# Patient Record
Sex: MALE | Race: WHITE | Employment: FULL TIME | URBAN - METROPOLITAN AREA
[De-identification: names, ages, dates, MRNs, and addresses within clinical notes are randomized per-mention and may not be internally consistent; named-entity substitution may affect disease eponyms.]

---

## 2023-04-21 ENCOUNTER — APPOINTMENT (OUTPATIENT)
Dept: GENERAL RADIOLOGY | Age: 83
End: 2023-04-21
Payer: MEDICARE

## 2023-04-21 ENCOUNTER — HOSPITAL ENCOUNTER (EMERGENCY)
Age: 83
Discharge: ANOTHER HOSPITAL | End: 2023-04-22
Attending: EMERGENCY MEDICINE
Payer: MEDICARE

## 2023-04-21 DIAGNOSIS — L03.119 CELLULITIS OF LOWER EXTREMITY, UNSPECIFIED LATERALITY: ICD-10-CM

## 2023-04-21 DIAGNOSIS — I50.9 CONGESTIVE HEART FAILURE, UNSPECIFIED HF CHRONICITY, UNSPECIFIED HEART FAILURE TYPE (HCC): ICD-10-CM

## 2023-04-21 DIAGNOSIS — I21.4 NSTEMI (NON-ST ELEVATED MYOCARDIAL INFARCTION) (HCC): Primary | ICD-10-CM

## 2023-04-21 LAB
ABSOLUTE EOS #: 0.1 K/UL (ref 0–0.4)
ABSOLUTE LYMPH #: 1 K/UL (ref 1–4.8)
ABSOLUTE MONO #: 0.6 K/UL (ref 0.1–1.2)
ANION GAP SERPL CALCULATED.3IONS-SCNC: 13 MMOL/L (ref 9–17)
BASOPHILS # BLD: 1 % (ref 0–2)
BASOPHILS ABSOLUTE: 0 K/UL (ref 0–0.2)
BNP SERPL-MCNC: 4339 PG/ML
BUN SERPL-MCNC: 25 MG/DL (ref 8–23)
CALCIUM SERPL-MCNC: 9.8 MG/DL (ref 8.6–10.4)
CHLORIDE SERPL-SCNC: 98 MMOL/L (ref 98–107)
CO2 SERPL-SCNC: 25 MMOL/L (ref 20–31)
CREAT SERPL-MCNC: 1.12 MG/DL (ref 0.7–1.2)
EOSINOPHILS RELATIVE PERCENT: 2 % (ref 1–4)
GFR SERPL CREATININE-BSD FRML MDRD: >60 ML/MIN/1.73M2
GLUCOSE SERPL-MCNC: 121 MG/DL (ref 70–99)
HCT VFR BLD AUTO: 36.3 % (ref 41–53)
HGB BLD-MCNC: 11.6 G/DL (ref 13.5–17.5)
LACTIC ACID, SEPSIS: 1.5 MMOL/L (ref 0.5–1.9)
LYMPHOCYTES # BLD: 25 % (ref 24–44)
MCH RBC QN AUTO: 29.6 PG (ref 26–34)
MCHC RBC AUTO-ENTMCNC: 31.8 G/DL (ref 31–37)
MCV RBC AUTO: 93 FL (ref 80–100)
MONOCYTES # BLD: 15 % (ref 2–11)
PARTIAL THROMBOPLASTIN TIME: 28.8 SEC (ref 21.3–31.3)
PDW BLD-RTO: 17.7 % (ref 12.5–15.4)
PLATELET # BLD AUTO: 258 K/UL (ref 140–450)
PMV BLD AUTO: 7.5 FL (ref 6–12)
POTASSIUM SERPL-SCNC: 4.8 MMOL/L (ref 3.7–5.3)
RBC # BLD: 3.9 M/UL (ref 4.5–5.9)
SEG NEUTROPHILS: 57 % (ref 36–66)
SEGMENTED NEUTROPHILS ABSOLUTE COUNT: 2.4 K/UL (ref 1.8–7.7)
SODIUM SERPL-SCNC: 136 MMOL/L (ref 135–144)
TROPONIN I SERPL DL<=0.01 NG/ML-MCNC: 71 NG/L (ref 0–22)
TROPONIN I SERPL DL<=0.01 NG/ML-MCNC: 80 NG/L (ref 0–22)
WBC # BLD AUTO: 4.1 K/UL (ref 3.5–11)

## 2023-04-21 PROCEDURE — 36415 COLL VENOUS BLD VENIPUNCTURE: CPT

## 2023-04-21 PROCEDURE — 2580000003 HC RX 258: Performed by: EMERGENCY MEDICINE

## 2023-04-21 PROCEDURE — 96365 THER/PROPH/DIAG IV INF INIT: CPT

## 2023-04-21 PROCEDURE — 84484 ASSAY OF TROPONIN QUANT: CPT

## 2023-04-21 PROCEDURE — 83880 ASSAY OF NATRIURETIC PEPTIDE: CPT

## 2023-04-21 PROCEDURE — 96376 TX/PRO/DX INJ SAME DRUG ADON: CPT

## 2023-04-21 PROCEDURE — 93005 ELECTROCARDIOGRAM TRACING: CPT | Performed by: EMERGENCY MEDICINE

## 2023-04-21 PROCEDURE — 83605 ASSAY OF LACTIC ACID: CPT

## 2023-04-21 PROCEDURE — 71045 X-RAY EXAM CHEST 1 VIEW: CPT

## 2023-04-21 PROCEDURE — 6370000000 HC RX 637 (ALT 250 FOR IP): Performed by: EMERGENCY MEDICINE

## 2023-04-21 PROCEDURE — 96375 TX/PRO/DX INJ NEW DRUG ADDON: CPT

## 2023-04-21 PROCEDURE — 99285 EMERGENCY DEPT VISIT HI MDM: CPT

## 2023-04-21 PROCEDURE — 80048 BASIC METABOLIC PNL TOTAL CA: CPT

## 2023-04-21 PROCEDURE — 85025 COMPLETE CBC W/AUTO DIFF WBC: CPT

## 2023-04-21 PROCEDURE — 85730 THROMBOPLASTIN TIME PARTIAL: CPT

## 2023-04-21 PROCEDURE — 6360000002 HC RX W HCPCS: Performed by: EMERGENCY MEDICINE

## 2023-04-21 RX ORDER — ALLOPURINOL 300 MG/1
300 TABLET ORAL DAILY
COMMUNITY
Start: 2009-10-27

## 2023-04-21 RX ORDER — HEPARIN SODIUM 10000 [USP'U]/100ML
5-30 INJECTION, SOLUTION INTRAVENOUS CONTINUOUS
Status: DISCONTINUED | OUTPATIENT
Start: 2023-04-21 | End: 2023-04-22 | Stop reason: HOSPADM

## 2023-04-21 RX ORDER — HEPARIN SODIUM 1000 [USP'U]/ML
4000 INJECTION, SOLUTION INTRAVENOUS; SUBCUTANEOUS PRN
Status: DISCONTINUED | OUTPATIENT
Start: 2023-04-21 | End: 2023-04-22 | Stop reason: HOSPADM

## 2023-04-21 RX ORDER — HEPARIN SODIUM 1000 [USP'U]/ML
2000 INJECTION, SOLUTION INTRAVENOUS; SUBCUTANEOUS PRN
Status: DISCONTINUED | OUTPATIENT
Start: 2023-04-21 | End: 2023-04-22 | Stop reason: HOSPADM

## 2023-04-21 RX ORDER — IPRATROPIUM BROMIDE 42 UG/1
2 SPRAY, METERED NASAL 3 TIMES DAILY
COMMUNITY
Start: 2022-08-31

## 2023-04-21 RX ORDER — TRIAMCINOLONE ACETONIDE 1 MG/G
CREAM TOPICAL PRN
COMMUNITY
Start: 2019-12-30

## 2023-04-21 RX ORDER — NIFEDIPINE 30 MG/1
30 TABLET, EXTENDED RELEASE ORAL
COMMUNITY
Start: 2019-06-21

## 2023-04-21 RX ORDER — ASPIRIN 81 MG/1
324 TABLET, CHEWABLE ORAL ONCE
Status: COMPLETED | OUTPATIENT
Start: 2023-04-21 | End: 2023-04-21

## 2023-04-21 RX ORDER — FUROSEMIDE 10 MG/ML
40 INJECTION INTRAMUSCULAR; INTRAVENOUS ONCE
Status: COMPLETED | OUTPATIENT
Start: 2023-04-21 | End: 2023-04-21

## 2023-04-21 RX ORDER — HEPARIN SODIUM 1000 [USP'U]/ML
4000 INJECTION, SOLUTION INTRAVENOUS; SUBCUTANEOUS ONCE
Status: COMPLETED | OUTPATIENT
Start: 2023-04-21 | End: 2023-04-21

## 2023-04-21 RX ORDER — BUMETANIDE 1 MG/1
1 TABLET ORAL
COMMUNITY
Start: 2015-05-29

## 2023-04-21 RX ORDER — SPIRONOLACTONE 25 MG/1
25 TABLET ORAL
COMMUNITY
Start: 2014-04-15

## 2023-04-21 RX ORDER — ACETAMINOPHEN 500 MG
500 TABLET ORAL EVERY 4 HOURS
COMMUNITY
Start: 2022-05-21

## 2023-04-21 RX ORDER — LOSARTAN POTASSIUM 100 MG/1
100 TABLET ORAL
COMMUNITY
Start: 2019-12-16

## 2023-04-21 RX ADMIN — HEPARIN SODIUM 8 UNITS/KG/HR: 10000 INJECTION, SOLUTION INTRAVENOUS at 22:24

## 2023-04-21 RX ADMIN — ASPIRIN 81 MG CHEWABLE TABLET 324 MG: 81 TABLET CHEWABLE at 20:12

## 2023-04-21 RX ADMIN — CEFAZOLIN 1000 MG: 1 INJECTION, POWDER, FOR SOLUTION INTRAMUSCULAR; INTRAVENOUS at 20:17

## 2023-04-21 RX ADMIN — HEPARIN SODIUM 4000 UNITS: 1000 INJECTION INTRAVENOUS; SUBCUTANEOUS at 22:22

## 2023-04-21 RX ADMIN — FUROSEMIDE 40 MG: 10 INJECTION, SOLUTION INTRAMUSCULAR; INTRAVENOUS at 21:30

## 2023-04-21 ASSESSMENT — PAIN - FUNCTIONAL ASSESSMENT
PAIN_FUNCTIONAL_ASSESSMENT: 0-10
PAIN_FUNCTIONAL_ASSESSMENT: PREVENTS OR INTERFERES SOME ACTIVE ACTIVITIES AND ADLS

## 2023-04-21 ASSESSMENT — ENCOUNTER SYMPTOMS
DIARRHEA: 0
ABDOMINAL PAIN: 0
VOMITING: 0
SORE THROAT: 0
COUGH: 0
SHORTNESS OF BREATH: 0
BACK PAIN: 0
NAUSEA: 0
PHOTOPHOBIA: 0
RHINORRHEA: 0

## 2023-04-21 ASSESSMENT — PAIN DESCRIPTION - LOCATION: LOCATION: LEG

## 2023-04-21 ASSESSMENT — PAIN DESCRIPTION - DESCRIPTORS: DESCRIPTORS: PRESSURE;PINS AND NEEDLES

## 2023-04-21 ASSESSMENT — PAIN DESCRIPTION - PAIN TYPE: TYPE: CHRONIC PAIN

## 2023-04-21 ASSESSMENT — PAIN SCALES - GENERAL: PAINLEVEL_OUTOF10: 6

## 2023-04-21 ASSESSMENT — PAIN DESCRIPTION - FREQUENCY: FREQUENCY: CONTINUOUS

## 2023-04-21 ASSESSMENT — PAIN DESCRIPTION - ORIENTATION: ORIENTATION: RIGHT;LEFT;LOWER

## 2023-04-21 ASSESSMENT — PAIN DESCRIPTION - ONSET: ONSET: GRADUAL

## 2023-04-21 NOTE — ED PROVIDER NOTES
[address] Emergency Department  [address] [facility] [address] [facility] [address]  Phone: [phone]  Fax: [phone]        Pt Name: Matthew Schumacher  MRN: [id number]  [place] 1940  Date of evaluation: 4/21/23      CHIEF COMPLAINT     Chief Complaint   Patient presents with    Leg Swelling     Patient complains of lower extremity edema and redness. Swelling has worsened over the past month. Patient states that his legs are warm to the touch. HISTORY OF PRESENT ILLNESS  (Location/Symptom, Timing/Onset, Context/Setting, Quality, Duration, Modifying Factors, Severity.)    Matthew Schumacher is a [de-identified] y.o. male who presents with bilateral lower extremity wound and leg swelling. The legs have been since January. The wounds have been gradually growing since February. The wounds are painful. No fever. He does have chills. The patient is diabetic. He has a history of atrial fibrillation and he takes Xarelto. No recent antibiotics. He does have a history of CHF and takes his diuretics as directed. He has had some chest tightness. No shortness of breath. No nausea or vomiting. No palpitations. The patient reports a 30 lb weight gain since January. REVIEW OF SYSTEMS    (2-9 systems for level 4, 10 or more for level 5)     Review of Systems   Constitutional:  Positive for chills. Negative for fever. HENT:  Positive for congestion. Negative for rhinorrhea and sore throat. Eyes:  Negative for photophobia and visual disturbance. Respiratory:  Negative for cough and shortness of breath. Cardiovascular:  Positive for chest pain. Negative for palpitations. Gastrointestinal:  Negative for abdominal pain, diarrhea, nausea and vomiting. Genitourinary:  Negative for dysuria, frequency and urgency. Musculoskeletal:  Negative for back pain and neck pain. Skin:  Negative for rash and wound. Neurological:  Negative for dizziness, light-headedness and headaches.    Hematological:  Negative

## 2023-04-22 VITALS
BODY MASS INDEX: 43.02 KG/M2 | WEIGHT: 252 LBS | SYSTOLIC BLOOD PRESSURE: 99 MMHG | HEART RATE: 95 BPM | HEIGHT: 64 IN | RESPIRATION RATE: 14 BRPM | OXYGEN SATURATION: 95 % | DIASTOLIC BLOOD PRESSURE: 58 MMHG | TEMPERATURE: 98.4 F

## 2023-04-22 LAB
EKG ATRIAL RATE: 115 BPM
EKG Q-T INTERVAL: 374 MS
EKG QRS DURATION: 132 MS
EKG QTC CALCULATION (BAZETT): 479 MS
EKG R AXIS: -4 DEGREES
EKG T AXIS: 92 DEGREES
EKG VENTRICULAR RATE: 99 BPM

## 2023-04-22 NOTE — ED NOTES
Per pt request, would like to be transferred to [facility].  Dr Ishmael Kerr requested to speak to hospitalist at [facility].  Called [facility] access to have hospitalist reyna Gotti RN  04/21/23 [address] [place] [address]

## 2023-04-22 NOTE — ED PROVIDER NOTES
FACULTY SIGN-OUT  ADDENDUM     Care of this patient was assumed from Dr. Perlita Arreaga. The patient was seen for Leg Swelling (Patient complains of lower extremity edema and redness. Swelling has worsened over the past month. Patient states that his legs are warm to the touch.)  . The patient's initial evaluation and plan have been discussed with the prior provider who initially evaluated the patient. Nursing Notes, Past Medical Hx, Past Surgical Hx, Social Hx, Allergies, and Family Hx were all reviewed. ED COURSE      The patient was given the following medications:  Orders Placed This Encounter   Medications    aspirin chewable tablet 324 mg    ceFAZolin (ANCEF) 1,000 mg in sodium chloride 0.9 % 50 mL IVPB (mini-bag)     Order Specific Question:   Antimicrobial Indications     Answer:   Skin and Soft Tissue Infection    heparin (porcine) injection 4,000 Units    heparin (porcine) injection 4,000 Units    heparin (porcine) injection 2,000 Units    heparin 25,000 units in dextrose 5% 250 mL (premix) infusion       Labs Reviewed   BASIC METABOLIC PANEL - Abnormal; Notable for the following components:       Result Value    Glucose 121 (*)     BUN 25 (*)     All other components within normal limits   CBC WITH AUTO DIFFERENTIAL - Abnormal; Notable for the following components:    RBC 3.90 (*)     Hemoglobin 11.6 (*)     Hematocrit 36.3 (*)     RDW 17.7 (*)     Monocytes 15 (*)     All other components within normal limits   BRAIN NATRIURETIC PEPTIDE - Abnormal; Notable for the following components:    Pro-BNP 4,339 (*)     All other components within normal limits   TROPONIN - Abnormal; Notable for the following components:    Troponin, High Sensitivity 80 (*)     All other components within normal limits   LACTATE, SEPSIS   TROPONIN   APTT   APTT   Labs reviewed. XR CHEST PORTABLE    Result Date: 4/21/2023  EXAMINATION: ONE XRAY VIEW OF THE CHEST 4/21/2023 3:49 pm COMPARISON: None.  HISTORY: ORDERING SYSTEM

## 2024-07-29 ENCOUNTER — TELEPHONE (OUTPATIENT)
Dept: WOUND CARE | Age: 84
End: 2024-07-29

## 2024-07-29 NOTE — DISCHARGE INSTRUCTIONS
[facility] WOUND and HYPERBARIC TREATMENT  CENTER      Visit  Discharge Instructions / Physician Orders  DATE:     Home Care:     SUPPLIES ORDERED THRU:                     DATE LAST SUPPLIED     Wound Location:       Cleanse with: Liquid antibacterial soap and water, rinse well      Dressing Orders:  Primary dressing                       Secondary dressing                           secure with           x 30days     Frequency:       Additional Orders: Increase protein to diet (meat, cheese, eggs, fish, peanut butter, nuts and beans)  Multivitamin daily    OFFLOADING [] YES  TYPE:                  [] NA    Weekly wound care visits until determined otherwise.    Antibiotic therapy-wound care related YES [] NO [] NA[]    MY CHART []     Smart Device  []     HYPERBARIC TREATMENT-                TREATMENT #                          Your next appointment with the Wound Care Center is in 1 week                                                                                                   (Please note your next appointment above and if you are unable to keep, kindly give a 24 hour notice. Thank you.)  If more than 15 min late we cannot guarantee you will be seen due to clinician schedule  Per Policy, Excessive cancellation will call for dismissal from program.  If you experience any of the following, please call the Wound Care Center during business hours:  [phone]     * Increase in Pain  * Temperature over 101  * Increase in drainage from your wound  * Drainage with a foul odor  * Bleeding  * Increase in swelling  * Need for compression bandage changes due to slippage, breakthrough drainage.     If you need medical attention outside of the business hours of the Wound Care Centers please contact your PCP or go to the nearest emergency room.     The information contained in the After Visit Summary has been reviewed with me, the patient and/or responsible adult, by my health care provider(s). I had the

## 2024-07-29 NOTE — TELEPHONE ENCOUNTER
Patient advocate calling and canceling his [facility] wound care appointment on 7-31-24 stating that they had a home care nurse coming to the home and wanted to cancel this appointment with us, will call back if reschedule is needed.

## 2024-07-31 ENCOUNTER — HOSPITAL ENCOUNTER (OUTPATIENT)
Dept: WOUND CARE | Age: 84
Discharge: HOME | End: 2024-07-31

## 2024-10-05 ENCOUNTER — HOSPITAL ENCOUNTER (OUTPATIENT)
Age: 84
Setting detail: SPECIMEN
Discharge: HOME | End: 2024-10-05

## 2024-10-05 LAB
ANION GAP SERPL CALCULATED.3IONS-SCNC: 9 MMOL/L (ref 9–17)
BUN SERPL-MCNC: 51 MG/DL (ref 8–23)
BUN/CREAT SERPL: 43 (ref 9–20)
CALCIUM SERPL-MCNC: 9.2 MG/DL (ref 8.6–10.4)
CHLORIDE SERPL-SCNC: 103 MMOL/L (ref 98–107)
CO2 SERPL-SCNC: 26 MMOL/L (ref 20–31)
CREAT SERPL-MCNC: 1.2 MG/DL (ref 0.7–1.2)
ERYTHROCYTE [DISTWIDTH] IN BLOOD BY AUTOMATED COUNT: 19.3 % (ref 11.8–14.4)
GFR, ESTIMATED: 60 ML/MIN/1.73M2
GLUCOSE SERPL-MCNC: 182 MG/DL (ref 70–99)
HCT VFR BLD AUTO: 31.1 % (ref 40.7–50.3)
HGB BLD-MCNC: 9 G/DL (ref 13–17)
MCH RBC QN AUTO: 31.1 PG (ref 25.2–33.5)
MCHC RBC AUTO-ENTMCNC: 28.9 G/DL (ref 28.4–34.8)
MCV RBC AUTO: 107.6 FL (ref 82.6–102.9)
NRBC BLD-RTO: 0 PER 100 WBC
PLATELET # BLD AUTO: 171 K/UL (ref 138–453)
PMV BLD AUTO: 10.1 FL (ref 8.1–13.5)
POTASSIUM SERPL-SCNC: 5 MMOL/L (ref 3.7–5.3)
RBC # BLD AUTO: 2.89 M/UL (ref 4.21–5.77)
SODIUM SERPL-SCNC: 138 MMOL/L (ref 135–144)
WBC OTHER # BLD: 5.1 K/UL (ref 3.5–11.3)

## 2024-10-05 PROCEDURE — 80048 BASIC METABOLIC PNL TOTAL CA: CPT

## 2024-10-05 PROCEDURE — P9603 ONE-WAY ALLOW PRORATED MILES: HCPCS

## 2024-10-05 PROCEDURE — 36415 COLL VENOUS BLD VENIPUNCTURE: CPT

## 2024-10-05 PROCEDURE — 85027 COMPLETE CBC AUTOMATED: CPT

## 2024-10-10 ENCOUNTER — HOSPITAL ENCOUNTER (OUTPATIENT)
Age: 84
Setting detail: SPECIMEN
Discharge: HOME | End: 2024-10-10

## 2024-10-10 LAB
ANION GAP SERPL CALCULATED.3IONS-SCNC: 9 MMOL/L (ref 9–17)
BUN SERPL-MCNC: 46 MG/DL (ref 8–23)
BUN/CREAT SERPL: 35 (ref 9–20)
CALCIUM SERPL-MCNC: 9 MG/DL (ref 8.6–10.4)
CHLORIDE SERPL-SCNC: 100 MMOL/L (ref 98–107)
CO2 SERPL-SCNC: 29 MMOL/L (ref 20–31)
CREAT SERPL-MCNC: 1.3 MG/DL (ref 0.7–1.2)
ERYTHROCYTE [DISTWIDTH] IN BLOOD BY AUTOMATED COUNT: 18.8 % (ref 11.8–14.4)
GFR, ESTIMATED: 55 ML/MIN/1.73M2
GLUCOSE SERPL-MCNC: 143 MG/DL (ref 70–99)
HCT VFR BLD AUTO: 29.1 % (ref 40.7–50.3)
HGB BLD-MCNC: 8.8 G/DL (ref 13–17)
MCH RBC QN AUTO: 31.1 PG (ref 25.2–33.5)
MCHC RBC AUTO-ENTMCNC: 30.2 G/DL (ref 28.4–34.8)
MCV RBC AUTO: 102.8 FL (ref 82.6–102.9)
NRBC BLD-RTO: 0 PER 100 WBC
PLATELET # BLD AUTO: 181 K/UL (ref 138–453)
PMV BLD AUTO: 9.6 FL (ref 8.1–13.5)
POTASSIUM SERPL-SCNC: 4.5 MMOL/L (ref 3.7–5.3)
RBC # BLD AUTO: 2.83 M/UL (ref 4.21–5.77)
SODIUM SERPL-SCNC: 138 MMOL/L (ref 135–144)
WBC OTHER # BLD: 3.8 K/UL (ref 3.5–11.3)

## 2024-10-10 PROCEDURE — 80048 BASIC METABOLIC PNL TOTAL CA: CPT

## 2024-10-10 PROCEDURE — P9603 ONE-WAY ALLOW PRORATED MILES: HCPCS

## 2024-10-10 PROCEDURE — 85027 COMPLETE CBC AUTOMATED: CPT

## 2024-10-14 ENCOUNTER — HOSPITAL ENCOUNTER (OUTPATIENT)
Age: 84
Setting detail: SPECIMEN
Discharge: HOME | End: 2024-10-14

## 2024-10-14 LAB — BNP SERPL-MCNC: 6855 PG/ML

## 2024-10-14 PROCEDURE — 83880 ASSAY OF NATRIURETIC PEPTIDE: CPT

## 2024-10-14 PROCEDURE — 36415 COLL VENOUS BLD VENIPUNCTURE: CPT

## 2024-10-14 PROCEDURE — P9603 ONE-WAY ALLOW PRORATED MILES: HCPCS

## 2024-10-15 ENCOUNTER — HOSPITAL ENCOUNTER (OUTPATIENT)
Age: 84
Setting detail: SPECIMEN
Discharge: HOME | End: 2024-10-15

## 2024-10-15 LAB
ANION GAP SERPL CALCULATED.3IONS-SCNC: 11 MMOL/L (ref 9–17)
BUN SERPL-MCNC: 48 MG/DL (ref 8–23)
BUN/CREAT SERPL: 37 (ref 9–20)
CALCIUM SERPL-MCNC: 8.8 MG/DL (ref 8.6–10.4)
CHLORIDE SERPL-SCNC: 98 MMOL/L (ref 98–107)
CO2 SERPL-SCNC: 30 MMOL/L (ref 20–31)
CREAT SERPL-MCNC: 1.3 MG/DL (ref 0.7–1.2)
GFR, ESTIMATED: 55 ML/MIN/1.73M2
GLUCOSE SERPL-MCNC: 98 MG/DL (ref 70–99)
POTASSIUM SERPL-SCNC: 3.8 MMOL/L (ref 3.7–5.3)
SODIUM SERPL-SCNC: 139 MMOL/L (ref 135–144)

## 2024-10-15 PROCEDURE — P9603 ONE-WAY ALLOW PRORATED MILES: HCPCS

## 2024-10-15 PROCEDURE — 36415 COLL VENOUS BLD VENIPUNCTURE: CPT

## 2024-10-15 PROCEDURE — 80048 BASIC METABOLIC PNL TOTAL CA: CPT

## 2024-10-23 ENCOUNTER — HOSPITAL ENCOUNTER (OUTPATIENT)
Age: 84
Setting detail: SPECIMEN
Discharge: HOME | End: 2024-10-23

## 2024-10-23 LAB
ANION GAP SERPL CALCULATED.3IONS-SCNC: 10 MMOL/L (ref 9–17)
BUN SERPL-MCNC: 49 MG/DL (ref 8–23)
BUN/CREAT SERPL: 38 (ref 9–20)
CALCIUM SERPL-MCNC: 8.8 MG/DL (ref 8.6–10.4)
CHLORIDE SERPL-SCNC: 98 MMOL/L (ref 98–107)
CO2 SERPL-SCNC: 32 MMOL/L (ref 20–31)
CREAT SERPL-MCNC: 1.3 MG/DL (ref 0.7–1.2)
GFR, ESTIMATED: 55 ML/MIN/1.73M2
GLUCOSE SERPL-MCNC: 112 MG/DL (ref 70–99)
MAGNESIUM SERPL-MCNC: 1.7 MG/DL (ref 1.6–2.6)
POTASSIUM SERPL-SCNC: 3.7 MMOL/L (ref 3.7–5.3)
SODIUM SERPL-SCNC: 140 MMOL/L (ref 135–144)

## 2024-10-23 PROCEDURE — P9603 ONE-WAY ALLOW PRORATED MILES: HCPCS

## 2024-10-23 PROCEDURE — 83735 ASSAY OF MAGNESIUM: CPT

## 2024-10-23 PROCEDURE — 36415 COLL VENOUS BLD VENIPUNCTURE: CPT

## 2024-10-23 PROCEDURE — 80048 BASIC METABOLIC PNL TOTAL CA: CPT

## 2024-10-30 ENCOUNTER — HOSPITAL ENCOUNTER (OUTPATIENT)
Age: 84
Setting detail: SPECIMEN
Discharge: HOME | End: 2024-10-30

## 2024-10-30 LAB
ANION GAP SERPL CALCULATED.3IONS-SCNC: 11 MMOL/L (ref 9–17)
BUN SERPL-MCNC: 52 MG/DL (ref 8–23)
BUN/CREAT SERPL: 37 (ref 9–20)
CALCIUM SERPL-MCNC: 9.1 MG/DL (ref 8.6–10.4)
CHLORIDE SERPL-SCNC: 97 MMOL/L (ref 98–107)
CO2 SERPL-SCNC: 32 MMOL/L (ref 20–31)
CREAT SERPL-MCNC: 1.4 MG/DL (ref 0.7–1.2)
GFR, ESTIMATED: 50 ML/MIN/1.73M2
GLUCOSE SERPL-MCNC: 132 MG/DL (ref 70–99)
POTASSIUM SERPL-SCNC: 4 MMOL/L (ref 3.7–5.3)
SODIUM SERPL-SCNC: 140 MMOL/L (ref 135–144)

## 2024-10-30 PROCEDURE — 36415 COLL VENOUS BLD VENIPUNCTURE: CPT

## 2024-10-30 PROCEDURE — P9603 ONE-WAY ALLOW PRORATED MILES: HCPCS

## 2024-10-30 PROCEDURE — 80048 BASIC METABOLIC PNL TOTAL CA: CPT

## 2024-11-18 ENCOUNTER — HOSPITAL ENCOUNTER (INPATIENT)
Age: 84
LOS: 7 days | Discharge: NURSING, REHAB OR LONG-TERM CARE | DRG: 280 | End: 2024-11-26
Attending: EMERGENCY MEDICINE | Admitting: STUDENT IN AN ORGANIZED HEALTH CARE EDUCATION/TRAINING PROGRAM
Payer: MEDICARE

## 2024-11-18 ENCOUNTER — HOSPITAL ENCOUNTER (OUTPATIENT)
Age: 84
Setting detail: SPECIMEN
Discharge: HOME | End: 2024-11-18

## 2024-11-18 ENCOUNTER — APPOINTMENT (OUTPATIENT)
Dept: GENERAL RADIOLOGY | Age: 84
DRG: 280 | End: 2024-11-18
Payer: MEDICARE

## 2024-11-18 DIAGNOSIS — G47.33 OSA (OBSTRUCTIVE SLEEP APNEA): ICD-10-CM

## 2024-11-18 DIAGNOSIS — I50.9 CONGESTIVE HEART FAILURE, UNSPECIFIED HF CHRONICITY, UNSPECIFIED HEART FAILURE TYPE (HCC): Primary | ICD-10-CM

## 2024-11-18 DIAGNOSIS — R60.1 ANASARCA: ICD-10-CM

## 2024-11-18 DIAGNOSIS — R09.02 HYPOXIA: ICD-10-CM

## 2024-11-18 DIAGNOSIS — R06.02 SHORTNESS OF BREATH: ICD-10-CM

## 2024-11-18 LAB
ALBUMIN SERPL-MCNC: 3.5 G/DL (ref 3.5–5.2)
ALBUMIN/GLOB SERPL: 1.2 {RATIO} (ref 1–2.5)
ALP SERPL-CCNC: 67 U/L (ref 40–129)
ALT SERPL-CCNC: 12 U/L (ref 5–41)
ANION GAP SERPL CALCULATED.3IONS-SCNC: 10 MMOL/L (ref 9–16)
ANION GAP SERPL CALCULATED.3IONS-SCNC: 8 MMOL/L (ref 9–17)
AST SERPL-CCNC: 23 U/L
BASOPHILS # BLD: 0 K/UL (ref 0–0.2)
BASOPHILS # BLD: <0.03 K/UL (ref 0–0.2)
BASOPHILS NFR BLD: 1 % (ref 0–2)
BASOPHILS NFR BLD: 1 % (ref 0–2)
BILIRUB SERPL-MCNC: 0.6 MG/DL (ref 0.3–1.2)
BNP SERPL-MCNC: 3461 PG/ML (ref 0–450)
BNP SERPL-MCNC: 3852 PG/ML
BUN SERPL-MCNC: 62 MG/DL (ref 8–23)
BUN SERPL-MCNC: 66 MG/DL (ref 8–23)
CALCIUM SERPL-MCNC: 9.3 MG/DL (ref 8.8–10.2)
CALCIUM SERPL-MCNC: 9.6 MG/DL (ref 8.6–10.4)
CHLORIDE SERPL-SCNC: 98 MMOL/L (ref 98–107)
CHLORIDE SERPL-SCNC: 98 MMOL/L (ref 98–107)
CO2 SERPL-SCNC: 35 MMOL/L (ref 20–31)
CO2 SERPL-SCNC: 35 MMOL/L (ref 20–31)
CREAT SERPL-MCNC: 1.5 MG/DL (ref 0.7–1.2)
CREAT SERPL-MCNC: 1.5 MG/DL (ref 0.7–1.2)
EOSINOPHIL # BLD: 0.47 K/UL (ref 0–0.44)
EOSINOPHIL # BLD: 0.6 K/UL (ref 0–0.4)
EOSINOPHILS RELATIVE PERCENT: 11 % (ref 1–4)
EOSINOPHILS RELATIVE PERCENT: 13 % (ref 1–4)
ERYTHROCYTE [DISTWIDTH] IN BLOOD BY AUTOMATED COUNT: 17.3 % (ref 11.8–14.4)
ERYTHROCYTE [DISTWIDTH] IN BLOOD BY AUTOMATED COUNT: 18.4 % (ref 12.5–15.4)
GFR, ESTIMATED: 45 ML/MIN/1.73M2
GFR, ESTIMATED: 46 ML/MIN/1.73M2
GLUCOSE SERPL-MCNC: 155 MG/DL (ref 70–99)
GLUCOSE SERPL-MCNC: 156 MG/DL (ref 82–115)
HCT VFR BLD AUTO: 30.3 % (ref 41–53)
HCT VFR BLD AUTO: 31.8 % (ref 40.7–50.3)
HGB BLD-MCNC: 9.5 G/DL (ref 13–17)
HGB BLD-MCNC: 9.6 G/DL (ref 13.5–17.5)
IMM GRANULOCYTES # BLD AUTO: 0.01 K/UL (ref 0–0.3)
IMM GRANULOCYTES NFR BLD: 0 %
LYMPHOCYTES NFR BLD: 0.8 K/UL (ref 1–4.8)
LYMPHOCYTES NFR BLD: 0.86 K/UL (ref 1.1–3.7)
LYMPHOCYTES RELATIVE PERCENT: 18 % (ref 24–44)
LYMPHOCYTES RELATIVE PERCENT: 20 % (ref 24–43)
MAGNESIUM SERPL-MCNC: 1.8 MG/DL (ref 1.6–2.6)
MCH RBC QN AUTO: 30.2 PG (ref 26–34)
MCH RBC QN AUTO: 30.4 PG (ref 25.2–33.5)
MCHC RBC AUTO-ENTMCNC: 29.9 G/DL (ref 28.4–34.8)
MCHC RBC AUTO-ENTMCNC: 31.8 G/DL (ref 31–37)
MCV RBC AUTO: 101.6 FL (ref 82.6–102.9)
MCV RBC AUTO: 94.9 FL (ref 80–100)
MONOCYTES NFR BLD: 0.5 K/UL (ref 0.1–1.2)
MONOCYTES NFR BLD: 0.51 K/UL (ref 0.1–1.2)
MONOCYTES NFR BLD: 11 % (ref 2–11)
MONOCYTES NFR BLD: 12 % (ref 3–12)
NEUTROPHILS NFR BLD: 56 % (ref 36–65)
NEUTROPHILS NFR BLD: 57 % (ref 36–66)
NEUTS SEG NFR BLD: 2.43 K/UL (ref 1.5–8.1)
NEUTS SEG NFR BLD: 2.5 K/UL (ref 1.8–7.7)
NRBC BLD-RTO: 0 PER 100 WBC
PLATELET # BLD AUTO: 175 K/UL (ref 138–453)
PLATELET # BLD AUTO: 189 K/UL (ref 140–450)
PMV BLD AUTO: 7.3 FL (ref 6–12)
PMV BLD AUTO: 9.8 FL (ref 8.1–13.5)
POTASSIUM SERPL-SCNC: 3.8 MMOL/L (ref 3.7–5.3)
POTASSIUM SERPL-SCNC: 3.9 MMOL/L (ref 3.7–5.3)
PROT SERPL-MCNC: 6.4 G/DL (ref 6.4–8.3)
RBC # BLD AUTO: 3.13 M/UL (ref 4.21–5.77)
RBC # BLD AUTO: 3.19 M/UL (ref 4.5–5.9)
RBC # BLD: ABNORMAL 10*6/UL
SODIUM SERPL-SCNC: 141 MMOL/L (ref 135–144)
SODIUM SERPL-SCNC: 142 MMOL/L (ref 136–145)
TROPONIN I SERPL HS-MCNC: 97 NG/L (ref 0–22)
WBC OTHER # BLD: 4.3 K/UL (ref 3.5–11.3)
WBC OTHER # BLD: 4.4 K/UL (ref 3.5–11)

## 2024-11-18 PROCEDURE — 71045 X-RAY EXAM CHEST 1 VIEW: CPT

## 2024-11-18 PROCEDURE — 83880 ASSAY OF NATRIURETIC PEPTIDE: CPT

## 2024-11-18 PROCEDURE — 36415 COLL VENOUS BLD VENIPUNCTURE: CPT

## 2024-11-18 PROCEDURE — 93005 ELECTROCARDIOGRAM TRACING: CPT | Performed by: EMERGENCY MEDICINE

## 2024-11-18 PROCEDURE — 85025 COMPLETE CBC W/AUTO DIFF WBC: CPT

## 2024-11-18 PROCEDURE — P9603 ONE-WAY ALLOW PRORATED MILES: HCPCS

## 2024-11-18 PROCEDURE — 2700000000 HC OXYGEN THERAPY PER DAY

## 2024-11-18 PROCEDURE — 84484 ASSAY OF TROPONIN QUANT: CPT

## 2024-11-18 PROCEDURE — 83735 ASSAY OF MAGNESIUM: CPT

## 2024-11-18 PROCEDURE — 80053 COMPREHEN METABOLIC PANEL: CPT

## 2024-11-18 PROCEDURE — 99285 EMERGENCY DEPT VISIT HI MDM: CPT

## 2024-11-18 PROCEDURE — 80048 BASIC METABOLIC PNL TOTAL CA: CPT

## 2024-11-18 ASSESSMENT — PAIN - FUNCTIONAL ASSESSMENT
PAIN_FUNCTIONAL_ASSESSMENT: NONE - DENIES PAIN
PAIN_FUNCTIONAL_ASSESSMENT: NONE - DENIES PAIN

## 2024-11-19 ENCOUNTER — APPOINTMENT (OUTPATIENT)
Age: 84
DRG: 280 | End: 2024-11-19
Payer: MEDICARE

## 2024-11-19 PROBLEM — R09.02 HYPOXIA: Status: ACTIVE | Noted: 2024-11-19

## 2024-11-19 PROBLEM — N18.9 CHRONIC KIDNEY DISEASE: Status: ACTIVE | Noted: 2024-11-19

## 2024-11-19 PROBLEM — I50.9 ACUTE DECOMPENSATED HEART FAILURE (HCC): Status: ACTIVE | Noted: 2024-11-19

## 2024-11-19 PROBLEM — R06.02 SHORTNESS OF BREATH: Status: ACTIVE | Noted: 2024-11-19

## 2024-11-19 PROBLEM — R60.1 ANASARCA: Status: ACTIVE | Noted: 2024-11-19

## 2024-11-19 LAB
ANION GAP SERPL CALCULATED.3IONS-SCNC: 7 MMOL/L (ref 9–17)
B PARAP IS1001 DNA NPH QL NAA+NON-PROBE: NOT DETECTED
B PERT DNA SPEC QL NAA+PROBE: NOT DETECTED
BUN SERPL-MCNC: 64 MG/DL (ref 8–23)
C PNEUM DNA NPH QL NAA+NON-PROBE: NOT DETECTED
CALCIUM SERPL-MCNC: 9.3 MG/DL (ref 8.6–10.4)
CHLORIDE SERPL-SCNC: 100 MMOL/L (ref 98–107)
CO2 SERPL-SCNC: 36 MMOL/L (ref 20–31)
CREAT SERPL-MCNC: 1.4 MG/DL (ref 0.7–1.2)
ECHO AO ROOT DIAM: 3.9 CM
ECHO AO ROOT INDEX: 1.81 CM/M2
ECHO AR MAX VEL PISA: 3.2 M/S
ECHO AV AREA PEAK VELOCITY: 3.4 CM2
ECHO AV AREA/BSA PEAK VELOCITY: 1.6 CM2/M2
ECHO AV PEAK GRADIENT: 6 MMHG
ECHO AV PEAK VELOCITY: 1.2 M/S
ECHO AV REGURGITANT PHT: 445 MS
ECHO AV VELOCITY RATIO: 0.75
ECHO BSA: 2.27 M2
ECHO EST RA PRESSURE: 15 MMHG
ECHO IVC PROX: 3.6 CM
ECHO LA AREA 2C: 27 CM2
ECHO LA AREA 4C: 27 CM2
ECHO LA DIAMETER INDEX: 2.13 CM/M2
ECHO LA DIAMETER: 4.6 CM
ECHO LA MAJOR AXIS: 7.4 CM
ECHO LA MINOR AXIS: 6.2 CM
ECHO LA TO AORTIC ROOT RATIO: 1.18
ECHO LA VOL BP: 98 ML (ref 18–58)
ECHO LA VOL MOD A2C: 96 ML (ref 18–58)
ECHO LA VOL MOD A4C: 88 ML (ref 18–58)
ECHO LA VOL/BSA BIPLANE: 45 ML/M2 (ref 16–34)
ECHO LA VOLUME INDEX MOD A2C: 44 ML/M2 (ref 16–34)
ECHO LA VOLUME INDEX MOD A4C: 41 ML/M2 (ref 16–34)
ECHO LV EDV A2C: 139 ML
ECHO LV EDV A4C: 107 ML
ECHO LV EDV INDEX A4C: 50 ML/M2
ECHO LV EDV NDEX A2C: 64 ML/M2
ECHO LV EJECTION FRACTION A2C: 44 %
ECHO LV EJECTION FRACTION A4C: 43 %
ECHO LV EJECTION FRACTION BIPLANE: 43 % (ref 55–100)
ECHO LV ESV A2C: 77 ML
ECHO LV ESV A4C: 62 ML
ECHO LV ESV INDEX A2C: 36 ML/M2
ECHO LV ESV INDEX A4C: 29 ML/M2
ECHO LV FRACTIONAL SHORTENING: 24 % (ref 28–44)
ECHO LV INTERNAL DIMENSION DIASTOLE INDEX: 2.55 CM/M2
ECHO LV INTERNAL DIMENSION DIASTOLIC: 5.5 CM (ref 4.2–5.9)
ECHO LV INTERNAL DIMENSION SYSTOLIC INDEX: 1.94 CM/M2
ECHO LV INTERNAL DIMENSION SYSTOLIC: 4.2 CM
ECHO LV IVSD: 1.2 CM (ref 0.6–1)
ECHO LV MASS 2D: 272.4 G (ref 88–224)
ECHO LV MASS INDEX 2D: 126.1 G/M2 (ref 49–115)
ECHO LV POSTERIOR WALL DIASTOLIC: 1.2 CM (ref 0.6–1)
ECHO LV RELATIVE WALL THICKNESS RATIO: 0.44
ECHO LVOT AREA: 4.9 CM2
ECHO LVOT DIAM: 2.5 CM
ECHO LVOT PEAK GRADIENT: 3 MMHG
ECHO LVOT PEAK VELOCITY: 0.9 M/S
ECHO MV E DECELERATION TIME (DT): 175 MS
ECHO MV E VELOCITY: 1.18 M/S
ECHO MV MAX VELOCITY: 1.2 M/S
ECHO MV MEAN GRADIENT: 2 MMHG
ECHO MV MEAN VELOCITY: 0.7 M/S
ECHO MV PEAK GRADIENT: 6 MMHG
ECHO MV VTI: 29.6 CM
ECHO PV MAX VELOCITY: 0.9 M/S
ECHO PV PEAK GRADIENT: 3 MMHG
ECHO RA AREA 4C: 46.1 CM2
ECHO RA END SYSTOLIC VOLUME APICAL 4 CHAMBER INDEX BSA: 96 ML/M2
ECHO RA VOLUME: 208 ML
ECHO RIGHT VENTRICULAR SYSTOLIC PRESSURE (RVSP): 52 MMHG
ECHO RV BASAL DIMENSION: 5.8 CM
ECHO RV FREE WALL PEAK S': 7.9 CM/S
ECHO RV TAPSE: 1.3 CM (ref 1.7–?)
ECHO TV PEAK GRADIENT: 3 MMHG
ECHO TV REGURGITANT MAX VELOCITY: 3.03 M/S
ECHO TV REGURGITANT PEAK GRADIENT: 37 MMHG
FERRITIN SERPL-MCNC: 35 NG/ML
FLUAV RNA NPH QL NAA+NON-PROBE: NOT DETECTED
FLUBV RNA NPH QL NAA+NON-PROBE: NOT DETECTED
GFR, ESTIMATED: 50 ML/MIN/1.73M2
GLUCOSE BLD-MCNC: 132 MG/DL (ref 75–110)
GLUCOSE BLD-MCNC: 161 MG/DL (ref 75–110)
GLUCOSE BLD-MCNC: 162 MG/DL (ref 75–110)
GLUCOSE SERPL-MCNC: 135 MG/DL (ref 70–99)
HADV DNA NPH QL NAA+NON-PROBE: NOT DETECTED
HCOV 229E RNA NPH QL NAA+NON-PROBE: NOT DETECTED
HCOV HKU1 RNA NPH QL NAA+NON-PROBE: NOT DETECTED
HCOV NL63 RNA NPH QL NAA+NON-PROBE: NOT DETECTED
HCOV OC43 RNA NPH QL NAA+NON-PROBE: NOT DETECTED
HMPV RNA NPH QL NAA+NON-PROBE: NOT DETECTED
HPIV1 RNA NPH QL NAA+NON-PROBE: NOT DETECTED
HPIV2 RNA NPH QL NAA+NON-PROBE: NOT DETECTED
HPIV3 RNA NPH QL NAA+NON-PROBE: NOT DETECTED
HPIV4 RNA NPH QL NAA+NON-PROBE: NOT DETECTED
IRON SATN MFR SERPL: 16 % (ref 20–55)
IRON SERPL-MCNC: 48 UG/DL (ref 61–157)
M PNEUMO DNA NPH QL NAA+NON-PROBE: NOT DETECTED
MAGNESIUM SERPL-MCNC: 1.7 MG/DL (ref 1.6–2.6)
POTASSIUM SERPL-SCNC: 3.9 MMOL/L (ref 3.7–5.3)
PROCALCITONIN SERPL-MCNC: 0.08 NG/ML (ref 0–0.09)
RSV RNA NPH QL NAA+NON-PROBE: NOT DETECTED
RV+EV RNA NPH QL NAA+NON-PROBE: NOT DETECTED
SARS-COV-2 RNA NPH QL NAA+NON-PROBE: NOT DETECTED
SODIUM SERPL-SCNC: 143 MMOL/L (ref 135–144)
SPECIMEN DESCRIPTION: NORMAL
TIBC SERPL-MCNC: 309 UG/DL (ref 250–450)
TROPONIN I SERPL HS-MCNC: 88 NG/L (ref 0–22)
TSH SERPL DL<=0.05 MIU/L-ACNC: 2.47 UIU/ML (ref 0.3–5)
UNSATURATED IRON BINDING CAPACITY: 261 UG/DL (ref 112–347)

## 2024-11-19 PROCEDURE — 93306 TTE W/DOPPLER COMPLETE: CPT

## 2024-11-19 PROCEDURE — 0202U NFCT DS 22 TRGT SARS-COV-2: CPT

## 2024-11-19 PROCEDURE — APPSS30 APP SPLIT SHARED TIME 16-30 MINUTES

## 2024-11-19 PROCEDURE — 97535 SELF CARE MNGMENT TRAINING: CPT

## 2024-11-19 PROCEDURE — 82728 ASSAY OF FERRITIN: CPT

## 2024-11-19 PROCEDURE — 2060000000 HC ICU INTERMEDIATE R&B

## 2024-11-19 PROCEDURE — 96375 TX/PRO/DX INJ NEW DRUG ADDON: CPT

## 2024-11-19 PROCEDURE — 6360000002 HC RX W HCPCS: Performed by: EMERGENCY MEDICINE

## 2024-11-19 PROCEDURE — 2700000000 HC OXYGEN THERAPY PER DAY

## 2024-11-19 PROCEDURE — 84145 PROCALCITONIN (PCT): CPT

## 2024-11-19 PROCEDURE — 36415 COLL VENOUS BLD VENIPUNCTURE: CPT

## 2024-11-19 PROCEDURE — 97162 PT EVAL MOD COMPLEX 30 MIN: CPT

## 2024-11-19 PROCEDURE — 6370000000 HC RX 637 (ALT 250 FOR IP)

## 2024-11-19 PROCEDURE — 93306 TTE W/DOPPLER COMPLETE: CPT | Performed by: INTERNAL MEDICINE

## 2024-11-19 PROCEDURE — 99223 1ST HOSP IP/OBS HIGH 75: CPT | Performed by: INTERNAL MEDICINE

## 2024-11-19 PROCEDURE — 6370000000 HC RX 637 (ALT 250 FOR IP): Performed by: NURSE PRACTITIONER

## 2024-11-19 PROCEDURE — 94761 N-INVAS EAR/PLS OXIMETRY MLT: CPT

## 2024-11-19 PROCEDURE — 96365 THER/PROPH/DIAG IV INF INIT: CPT

## 2024-11-19 PROCEDURE — 6360000002 HC RX W HCPCS: Performed by: INTERNAL MEDICINE

## 2024-11-19 PROCEDURE — 83540 ASSAY OF IRON: CPT

## 2024-11-19 PROCEDURE — 83550 IRON BINDING TEST: CPT

## 2024-11-19 PROCEDURE — 82947 ASSAY GLUCOSE BLOOD QUANT: CPT

## 2024-11-19 PROCEDURE — 97530 THERAPEUTIC ACTIVITIES: CPT

## 2024-11-19 PROCEDURE — 80048 BASIC METABOLIC PNL TOTAL CA: CPT

## 2024-11-19 PROCEDURE — 97167 OT EVAL HIGH COMPLEX 60 MIN: CPT

## 2024-11-19 PROCEDURE — 2580000003 HC RX 258

## 2024-11-19 PROCEDURE — 84443 ASSAY THYROID STIM HORMONE: CPT

## 2024-11-19 PROCEDURE — 84484 ASSAY OF TROPONIN QUANT: CPT

## 2024-11-19 PROCEDURE — 83735 ASSAY OF MAGNESIUM: CPT

## 2024-11-19 RX ORDER — DEXTROSE MONOHYDRATE 100 MG/ML
INJECTION, SOLUTION INTRAVENOUS CONTINUOUS PRN
Status: DISCONTINUED | OUTPATIENT
Start: 2024-11-19 | End: 2024-11-26 | Stop reason: HOSPADM

## 2024-11-19 RX ORDER — POTASSIUM CHLORIDE 1500 MG/1
40 TABLET, EXTENDED RELEASE ORAL PRN
Status: DISCONTINUED | OUTPATIENT
Start: 2024-11-19 | End: 2024-11-26 | Stop reason: HOSPADM

## 2024-11-19 RX ORDER — BUMETANIDE 0.25 MG/ML
2 INJECTION, SOLUTION INTRAMUSCULAR; INTRAVENOUS DAILY
Status: DISCONTINUED | OUTPATIENT
Start: 2024-11-19 | End: 2024-11-19

## 2024-11-19 RX ORDER — ONDANSETRON 4 MG/1
4 TABLET, ORALLY DISINTEGRATING ORAL EVERY 8 HOURS PRN
Status: DISCONTINUED | OUTPATIENT
Start: 2024-11-19 | End: 2024-11-26 | Stop reason: HOSPADM

## 2024-11-19 RX ORDER — GLUCAGON 1 MG/ML
1 KIT INJECTION PRN
Status: DISCONTINUED | OUTPATIENT
Start: 2024-11-19 | End: 2024-11-26 | Stop reason: HOSPADM

## 2024-11-19 RX ORDER — POLYETHYLENE GLYCOL 3350 17 G/17G
17 POWDER, FOR SOLUTION ORAL DAILY PRN
Status: DISCONTINUED | OUTPATIENT
Start: 2024-11-19 | End: 2024-11-26 | Stop reason: HOSPADM

## 2024-11-19 RX ORDER — ONDANSETRON 2 MG/ML
4 INJECTION INTRAMUSCULAR; INTRAVENOUS EVERY 6 HOURS PRN
Status: DISCONTINUED | OUTPATIENT
Start: 2024-11-19 | End: 2024-11-26 | Stop reason: HOSPADM

## 2024-11-19 RX ORDER — ATORVASTATIN CALCIUM 10 MG/1
10 TABLET, FILM COATED ORAL DAILY
COMMUNITY

## 2024-11-19 RX ORDER — METOPROLOL SUCCINATE 25 MG/1
25 TABLET, EXTENDED RELEASE ORAL DAILY
Status: DISCONTINUED | OUTPATIENT
Start: 2024-11-19 | End: 2024-11-26 | Stop reason: HOSPADM

## 2024-11-19 RX ORDER — SODIUM CHLORIDE 0.9 % (FLUSH) 0.9 %
5-40 SYRINGE (ML) INJECTION PRN
Status: DISCONTINUED | OUTPATIENT
Start: 2024-11-19 | End: 2024-11-26 | Stop reason: HOSPADM

## 2024-11-19 RX ORDER — ACETAMINOPHEN 650 MG/1
650 SUPPOSITORY RECTAL EVERY 6 HOURS PRN
Status: DISCONTINUED | OUTPATIENT
Start: 2024-11-19 | End: 2024-11-26 | Stop reason: HOSPADM

## 2024-11-19 RX ORDER — INSULIN LISPRO 100 [IU]/ML
0-4 INJECTION, SOLUTION INTRAVENOUS; SUBCUTANEOUS
Status: DISCONTINUED | OUTPATIENT
Start: 2024-11-19 | End: 2024-11-26 | Stop reason: HOSPADM

## 2024-11-19 RX ORDER — MAGNESIUM SULFATE IN WATER 40 MG/ML
2000 INJECTION, SOLUTION INTRAVENOUS PRN
Status: DISCONTINUED | OUTPATIENT
Start: 2024-11-19 | End: 2024-11-26 | Stop reason: HOSPADM

## 2024-11-19 RX ORDER — BUMETANIDE 0.25 MG/ML
2 INJECTION, SOLUTION INTRAMUSCULAR; INTRAVENOUS ONCE
Status: COMPLETED | OUTPATIENT
Start: 2024-11-19 | End: 2024-11-19

## 2024-11-19 RX ORDER — LEVOFLOXACIN 5 MG/ML
500 INJECTION, SOLUTION INTRAVENOUS ONCE
Status: COMPLETED | OUTPATIENT
Start: 2024-11-19 | End: 2024-11-19

## 2024-11-19 RX ORDER — IPRATROPIUM BROMIDE 42 UG/1
2 SPRAY, METERED NASAL 3 TIMES DAILY
Status: DISCONTINUED | OUTPATIENT
Start: 2024-11-19 | End: 2024-11-26 | Stop reason: HOSPADM

## 2024-11-19 RX ORDER — POTASSIUM CHLORIDE 7.45 MG/ML
10 INJECTION INTRAVENOUS PRN
Status: DISCONTINUED | OUTPATIENT
Start: 2024-11-19 | End: 2024-11-26 | Stop reason: HOSPADM

## 2024-11-19 RX ORDER — ACETAMINOPHEN 325 MG/1
650 TABLET ORAL EVERY 6 HOURS PRN
Status: DISCONTINUED | OUTPATIENT
Start: 2024-11-19 | End: 2024-11-26 | Stop reason: HOSPADM

## 2024-11-19 RX ORDER — SODIUM CHLORIDE 0.9 % (FLUSH) 0.9 %
5-40 SYRINGE (ML) INJECTION EVERY 12 HOURS SCHEDULED
Status: DISCONTINUED | OUTPATIENT
Start: 2024-11-19 | End: 2024-11-26 | Stop reason: HOSPADM

## 2024-11-19 RX ORDER — SODIUM CHLORIDE 9 MG/ML
INJECTION, SOLUTION INTRAVENOUS PRN
Status: DISCONTINUED | OUTPATIENT
Start: 2024-11-19 | End: 2024-11-26 | Stop reason: HOSPADM

## 2024-11-19 RX ORDER — BUMETANIDE 0.25 MG/ML
2 INJECTION, SOLUTION INTRAMUSCULAR; INTRAVENOUS 2 TIMES DAILY
Status: DISCONTINUED | OUTPATIENT
Start: 2024-11-19 | End: 2024-11-21

## 2024-11-19 RX ORDER — ATORVASTATIN CALCIUM 10 MG/1
10 TABLET, FILM COATED ORAL DAILY
Status: DISCONTINUED | OUTPATIENT
Start: 2024-11-19 | End: 2024-11-26 | Stop reason: HOSPADM

## 2024-11-19 RX ADMIN — RIVAROXABAN 15 MG: 15 TABLET, FILM COATED ORAL at 17:48

## 2024-11-19 RX ADMIN — BUMETANIDE 2 MG: 0.25 INJECTION INTRAMUSCULAR; INTRAVENOUS at 10:06

## 2024-11-19 RX ADMIN — IPRATROPIUM BROMIDE 2 SPRAY: 42 SPRAY NASAL at 20:36

## 2024-11-19 RX ADMIN — EMPAGLIFLOZIN 10 MG: 10 TABLET, FILM COATED ORAL at 16:28

## 2024-11-19 RX ADMIN — BUMETANIDE 2 MG: 0.25 INJECTION INTRAMUSCULAR; INTRAVENOUS at 02:00

## 2024-11-19 RX ADMIN — BUMETANIDE 2 MG: 0.25 INJECTION INTRAMUSCULAR; INTRAVENOUS at 17:48

## 2024-11-19 RX ADMIN — LEVOFLOXACIN 500 MG: 5 INJECTION, SOLUTION INTRAVENOUS at 01:58

## 2024-11-19 RX ADMIN — METOPROLOL SUCCINATE 25 MG: 25 TABLET, EXTENDED RELEASE ORAL at 16:28

## 2024-11-19 RX ADMIN — IPRATROPIUM BROMIDE 2 SPRAY: 42 SPRAY NASAL at 10:22

## 2024-11-19 RX ADMIN — SODIUM CHLORIDE, PRESERVATIVE FREE 10 ML: 5 INJECTION INTRAVENOUS at 10:18

## 2024-11-19 RX ADMIN — SODIUM CHLORIDE, PRESERVATIVE FREE 10 ML: 5 INJECTION INTRAVENOUS at 20:36

## 2024-11-19 RX ADMIN — ATORVASTATIN CALCIUM 10 MG: 10 TABLET, FILM COATED ORAL at 20:36

## 2024-11-19 RX ADMIN — IPRATROPIUM BROMIDE 2 SPRAY: 42 SPRAY NASAL at 14:40

## 2024-11-19 NOTE — CONSULTS
[facility] Cardiology      Name:   Román Judd :  1940   MRN:   [id number] Gender:   male   PCP:  Taun Singh MD Age: 84 y.o.   PCP Fax:  None     Hospital:           [facility]    Encounter Date:     24        Reason for Consult: Congestive Heart Failure    HPI: Román Judd is an 84 y.o. male With history of paroxysmal atrial fibrillation, chronic combined systolic and diastolic congestive heart failure with an ejection fraction of 40-45%, mitral valve regurgitation and stenosis, moderate to severe tricuspid valve regurgitation, pulmonary hypertension, diabetes, hypertension, sleep apnea, chronic kidney disease and lymphedema, who presented to the hospital via EMS due to reporte increased dyspnea, 10 pound weight increase over 4 days, and abnormal labs (??).  He lives at [facility] since August.  He does not ambulate.  Oxygen saturation was 93 % on RA, /75, low grade fever was noted in the ER.  Per patient he had no change in symptoms.   In the emergency center chest x-ray showed mild cardiomegaly and mild central pulmonary congestion.   BMP: Sodium 142, potassium 3.8, BUN 62, creatinine 1.5, pro BNP 3461, troponin 97-88, hemoglobin 9.5   Cardiology is consulted for elevated troponin and CHF.    Patient previously established  with [facility] cardiology, however is requesting to switch groups due to location to Dr. Awan/ Dr. Early.  Last seen in primary cardiology office over 1 year ago however he has had multiple hospitalizations for chronic lymphedema, worsening lower extremity swelling wth open wounds, an acute Congestive Heart Failure exacerbation.  His last echocardiogram in 2023 showed EF 40-45% with mild aortic and mitral valve regurgitation however he had severe tricuspid valve regurgitation with at least moderate pulmonary hypertension.  Repeat ECHO was just completed and pending.      PAST MED/SURG HISTORY:     Past Medical History:   Diagnosis

## 2024-11-19 NOTE — ED PROVIDER NOTES
20 minutes.  This excludes any time for separately reportable procedures.   '    PROCEDURES:    None      OARRS Report if indicated       FINAL IMPRESSION      1. Hypoxia    2. Congestive heart failure, unspecified HF chronicity, unspecified heart failure type (HCC)    3. Anasarca          DISPOSITION/PLAN   DISPOSITION Decision To Admit 11/19/2024 12:22:15 AM         CONDITION ON DISPOSITION: See chart     PATIENT REFERRED TO:  No follow-up provider specified.    DISCHARGE MEDICATIONS:  New Prescriptions    No medications on file       (Please note that portions of this note were completed with a voice recognition program.  Efforts were made to edit the dictations but occasionally words are mis-transcribed.  Additionally, portions of this note may also include information that was incorporated after care transfer to another provider that were not available at the time of my evaluation.  Some of this information could likely include laboratory values, vital sign updates, medications etc.)    Grisel Kasper DO   Attending Emergency Physician        Grisel Kasper,   11/19/24 0032       Grisel Kasper,   11/19/24 0038

## 2024-11-19 NOTE — PLAN OF CARE
Problem: Chronic Conditions and Co-morbidities  Goal: Patient's chronic conditions and co-morbidity symptoms are monitored and maintained or improved  Outcome: Progressing  Flowsheets (Taken 11/19/2024 0358)  Care Plan - Patient's Chronic Conditions and Co-Morbidity Symptoms are Monitored and Maintained or Improved: Monitor and assess patient's chronic conditions and comorbid symptoms for stability, deterioration, or improvement     Problem: Discharge Planning  Goal: Discharge to home or other facility with appropriate resources  Outcome: Progressing  Flowsheets (Taken 11/19/2024 0358)  Discharge to home or other facility with appropriate resources:   Identify barriers to discharge with patient and caregiver   Identify discharge learning needs (meds, wound care, etc)   Arrange for needed discharge resources and transportation as appropriate   Refer to discharge planning if patient needs post-hospital services based on physician order or complex needs related to functional status, cognitive ability or social support system     Problem: Safety - Adult  Goal: Free from fall injury  Outcome: Progressing

## 2024-11-19 NOTE — PLAN OF CARE
Problem: Chronic Conditions and Co-morbidities  Goal: Patient's chronic conditions and co-morbidity symptoms are monitored and maintained or improved  11/19/2024 1145 by Lesia Tuttle RN  Outcome: Progressing  Flowsheets (Taken 11/19/2024 0811)  Care Plan - Patient's Chronic Conditions and Co-Morbidity Symptoms are Monitored and Maintained or Improved:   Monitor and assess patient's chronic conditions and comorbid symptoms for stability, deterioration, or improvement   Collaborate with multidisciplinary team to address chronic and comorbid conditions and prevent exacerbation or deterioration     Problem: Discharge Planning  Goal: Discharge to home or other facility with appropriate resources  11/19/2024 1145 by Lesia Tuttle RN  Outcome: Progressing  Flowsheets (Taken 11/19/2024 0811)  Discharge to home or other facility with appropriate resources:   Identify barriers to discharge with patient and caregiver   Arrange for needed discharge resources and transportation as appropriate   Refer to discharge planning if patient needs post-hospital services based on physician order or complex needs related to functional status, cognitive ability or social support system     Problem: Safety - Adult  Goal: Free from fall injury  11/19/2024 1145 by Lesia Tuttle RN  Outcome: Progressing  Flowsheets (Taken 11/19/2024 1034)  Free From Fall Injury: Instruct family/caregiver on patient safety     Problem: Safety - Adult  Goal: Free from fall injury  11/19/2024 1145 by Lesia Tuttle RN  Outcome: Progressing  Flowsheets (Taken 11/19/2024 1034)  Free From Fall Injury: Instruct family/caregiver on patient safety     Problem: Skin/Tissue Integrity  Goal: Absence of new skin breakdown  Description: 1.  Monitor for areas of redness and/or skin breakdown  2.  Assess vascular access sites hourly  3.  Every 4-6 hours minimum:  Change oxygen saturation probe site  4.  Every 4-6 hours:  If on nasal continuous positive airway

## 2024-11-19 NOTE — ED NOTES
ED to inpatient nurses report      Chief Complaint:  Chief Complaint   Patient presents with    Shortness of Breath    Edema     SNF called EMS for lower extremity swelling     Present to ED from: [facility]    MOA:     LOC: alert and orientated to name, place, date  Mobility: Fully dependent  Oxygen Baseline: ra BUT ON 2L/NC/MIN CURRENTLY    Current needs required: 2L/NC   Pending ED orders: NONE  Present condition: STABLE    Why did the patient come to the ED? ABNORMAL LABS AND EXTREMITY EDEMA  What is the plan? ADMIT  Any procedures or intervention occur? NONE  Any safety concerns??ASSIST  CODE STATUS No Order  Diet No diet orders on file    Mental Status:  Level of Consciousness: Alert (0)    Psych Assessment:   Psychosocial  Psychosocial (WDL): Within Defined Limits  Vital signs   Vitals:    11/19/24 0142 11/19/24 0202 11/19/24 0212 11/19/24 0230   BP: (!) 98/58 (!) 94/55 103/66 (!) 92/54   Pulse: 67 79 70 70   Resp: 20 17 16 13   Temp:       TempSrc:       SpO2: 99% 97% 100% 96%   Weight:       Height:            Vitals:  Patient Vitals for the past 24 hrs:   BP Temp Temp src Pulse Resp SpO2 Height Weight   11/19/24 0230 (!) 92/54 -- -- 70 13 96 % -- --   11/19/24 0212 103/66 -- -- 70 16 100 % -- --   11/19/24 0202 (!) 94/55 -- -- 79 17 97 % -- --   11/19/24 0142 (!) 98/58 -- -- 67 20 99 % -- --   11/18/24 2305 117/77 99 °F (37.2 °C) Oral -- 18 98 % 1.64 m (5' 4.57\") 114.3 kg (252 lb)   11/18/24 2257 122/75 -- -- 85 16 93 % 1.676 m (5' 6\") --      Visit Vitals  BP (!) 92/54   Pulse 70   Temp 99 °F (37.2 °C) (Oral)   Resp 13   Ht 1.64 m (5' 4.57\")   Wt 114.3 kg (252 lb)   SpO2 96%   BMI 42.50 kg/m²        LDAs:   Peripheral IV 11/18/24 Right Antecubital (Active)   Site Assessment Clean, dry & intact 11/18/24 2326   Line Status Blood return noted;Brisk blood return;Capped;Flushed;Specimen collected;Normal saline locked 11/18/24 2326   Phlebitis Assessment No symptoms 11/18/24 2326   Infiltration Assessment 0

## 2024-11-19 NOTE — DISCHARGE INSTR - COC
Discharge: ***    Physician Certification: I certify the above information and transfer of Román Judd  is necessary for the continuing treatment of the diagnosis listed and that he requires {Admit to Appropriate Level of Care:65841} for {GREATER/LESS:665930703} 30 days.     Update Admission H&P: {CHP DME Changes in HandP:478204956}    PHYSICIAN SIGNATURE:  {Esignature:[id number]}

## 2024-11-19 NOTE — H&P
[facility]  Office: [phone]  Jewel Armando DO, Chandrakant Tran DO, Gopal Giles DO, Garett Chapman DO, Rhina Renee MD, Melissa Mccain MD, Albert Terrazas MD, Isabella Clay MD,  Brandon Rushing MD, Melanie Sharma MD, Natacha Tamayo MD,  Kemal Cardona DO, Leonarda Cole MD, Donald Wyatt MD, All Armando DO, Evelina Casiano MD,  Ian Chahal DO, Elisha Dumont MD, Sofiya Fermin MD, Karolina Sampson MD, Allison Arauz MD,  Hitesh Camacho MD, Kanu Michele MD, Christina Coleman MD, Samson Farmer MD, Sathish Casillas MD, Maury Thurston MD, Yanick Parrish DO, Main Vila MD, Shirley Waterhouse, CNP,  Nalini Vivas, CNP, Yanick Samuels, CNP,  Brook Cervantes, JAZLYN, Beatrice Self, CNP, Mecca Avendano, CNP, Radha Desai, CNP, Carolina Tee, CNP, Yolanda Pate PA-C, Angela Pandya PA-C, Shaista Umanzor, CNP, Sigifredo Kennedy, CNP,  Celina Mark, CNP, Alyssa Camargo, CNP,  Therese Hung, CNP, Kathryn Cobb, CNP         [facility]   IN-PATIENT SERVICE   [facility]    HISTORY AND PHYSICAL EXAMINATION            Date:   11/19/2024  Patient name:  Román Judd  Date of admission:  11/18/2024 10:57 PM  MRN:   [id number]  Account:  [id number]  YOB: 1940  PCP:    Tuan Singh MD  Room:   ER06/ER06  Code Status:    No Order    Chief Complaint:     Chief Complaint   Patient presents with    Shortness of Breath    Edema     SNF called EMS for lower extremity swelling     History Obtained From:     patient, spouse, electronic medical record    History of Present Illness:     Román Judd is a 84 y.o. Non- / non  male who presents with Shortness of Breath and Edema (SNF called EMS for lower extremity swelling)   and is admitted to the hospital for the management of Acute decompensated heart failure (HCC).    Patient is a very pleasant 84-year-old male who is admitted for management of acute decompensated heart failure.  He has had increasing

## 2024-11-20 LAB
ALBUMIN SERPL-MCNC: 3.5 G/DL (ref 3.5–5.2)
ALBUMIN/GLOB SERPL: 1.3 {RATIO} (ref 1–2.5)
ALP SERPL-CCNC: 62 U/L (ref 40–129)
ALT SERPL-CCNC: 12 U/L (ref 5–41)
ANION GAP SERPL CALCULATED.3IONS-SCNC: 9 MMOL/L (ref 9–17)
AST SERPL-CCNC: 22 U/L
BILIRUB DIRECT SERPL-MCNC: 0.3 MG/DL
BILIRUB INDIRECT SERPL-MCNC: 0.4 MG/DL (ref 0–1)
BILIRUB SERPL-MCNC: 0.7 MG/DL (ref 0.3–1.2)
BUN SERPL-MCNC: 66 MG/DL (ref 8–23)
CALCIUM SERPL-MCNC: 9.4 MG/DL (ref 8.6–10.4)
CHLORIDE SERPL-SCNC: 99 MMOL/L (ref 98–107)
CHOLEST SERPL-MCNC: 96 MG/DL (ref 0–199)
CHOLESTEROL/HDL RATIO: 1.3
CO2 SERPL-SCNC: 35 MMOL/L (ref 20–31)
CREAT SERPL-MCNC: 1.6 MG/DL (ref 0.7–1.2)
EKG ATRIAL RATE: 277 BPM
EKG Q-T INTERVAL: 378 MS
EKG QRS DURATION: 84 MS
EKG QTC CALCULATION (BAZETT): 449 MS
EKG R AXIS: -16 DEGREES
EKG T AXIS: 64 DEGREES
EKG VENTRICULAR RATE: 85 BPM
EST. AVERAGE GLUCOSE BLD GHB EST-MCNC: 128 MG/DL
GFR, ESTIMATED: 43 ML/MIN/1.73M2
GLUCOSE BLD-MCNC: 118 MG/DL (ref 75–110)
GLUCOSE BLD-MCNC: 128 MG/DL (ref 75–110)
GLUCOSE BLD-MCNC: 141 MG/DL (ref 75–110)
GLUCOSE BLD-MCNC: 196 MG/DL (ref 75–110)
GLUCOSE SERPL-MCNC: 140 MG/DL (ref 70–99)
HBA1C MFR BLD: 6.1 % (ref 4–6)
HDLC SERPL-MCNC: 75 MG/DL
LDLC SERPL CALC-MCNC: 18 MG/DL (ref 0–100)
MAGNESIUM SERPL-MCNC: 1.8 MG/DL (ref 1.6–2.6)
POTASSIUM SERPL-SCNC: 4 MMOL/L (ref 3.7–5.3)
PROT SERPL-MCNC: 6.1 G/DL (ref 6.4–8.3)
SODIUM SERPL-SCNC: 143 MMOL/L (ref 135–144)
TRIGL SERPL-MCNC: 17 MG/DL
VLDLC SERPL CALC-MCNC: 3 MG/DL (ref 1–30)

## 2024-11-20 PROCEDURE — 83036 HEMOGLOBIN GLYCOSYLATED A1C: CPT

## 2024-11-20 PROCEDURE — 82947 ASSAY GLUCOSE BLOOD QUANT: CPT

## 2024-11-20 PROCEDURE — 2700000000 HC OXYGEN THERAPY PER DAY

## 2024-11-20 PROCEDURE — 97110 THERAPEUTIC EXERCISES: CPT

## 2024-11-20 PROCEDURE — 6370000000 HC RX 637 (ALT 250 FOR IP)

## 2024-11-20 PROCEDURE — 80076 HEPATIC FUNCTION PANEL: CPT

## 2024-11-20 PROCEDURE — 97530 THERAPEUTIC ACTIVITIES: CPT

## 2024-11-20 PROCEDURE — 2580000003 HC RX 258

## 2024-11-20 PROCEDURE — 97116 GAIT TRAINING THERAPY: CPT

## 2024-11-20 PROCEDURE — 2060000000 HC ICU INTERMEDIATE R&B

## 2024-11-20 PROCEDURE — 99232 SBSQ HOSP IP/OBS MODERATE 35: CPT | Performed by: INTERNAL MEDICINE

## 2024-11-20 PROCEDURE — 80048 BASIC METABOLIC PNL TOTAL CA: CPT

## 2024-11-20 PROCEDURE — 80061 LIPID PANEL: CPT

## 2024-11-20 PROCEDURE — 83735 ASSAY OF MAGNESIUM: CPT

## 2024-11-20 PROCEDURE — 36415 COLL VENOUS BLD VENIPUNCTURE: CPT

## 2024-11-20 PROCEDURE — 6360000002 HC RX W HCPCS: Performed by: INTERNAL MEDICINE

## 2024-11-20 PROCEDURE — 99213 OFFICE O/P EST LOW 20 MIN: CPT

## 2024-11-20 RX ADMIN — RIVAROXABAN 15 MG: 15 TABLET, FILM COATED ORAL at 17:22

## 2024-11-20 RX ADMIN — SODIUM CHLORIDE, PRESERVATIVE FREE 10 ML: 5 INJECTION INTRAVENOUS at 20:57

## 2024-11-20 RX ADMIN — BUMETANIDE 2 MG: 0.25 INJECTION INTRAMUSCULAR; INTRAVENOUS at 17:22

## 2024-11-20 RX ADMIN — IPRATROPIUM BROMIDE 2 SPRAY: 42 SPRAY NASAL at 09:57

## 2024-11-20 RX ADMIN — IPRATROPIUM BROMIDE 2 SPRAY: 42 SPRAY NASAL at 13:06

## 2024-11-20 RX ADMIN — BUMETANIDE 2 MG: 0.25 INJECTION INTRAMUSCULAR; INTRAVENOUS at 09:56

## 2024-11-20 RX ADMIN — SODIUM CHLORIDE, PRESERVATIVE FREE 10 ML: 5 INJECTION INTRAVENOUS at 09:57

## 2024-11-20 RX ADMIN — ATORVASTATIN CALCIUM 10 MG: 10 TABLET, FILM COATED ORAL at 20:57

## 2024-11-20 RX ADMIN — IPRATROPIUM BROMIDE 2 SPRAY: 42 SPRAY NASAL at 20:57

## 2024-11-20 NOTE — PLAN OF CARE
Problem: Chronic Conditions and Co-morbidities  Goal: Patient's chronic conditions and co-morbidity symptoms are monitored and maintained or improved  11/20/2024 1054 by Maura Monsivais RN  Outcome: Progressing     Problem: Discharge Planning  Goal: Discharge to home or other facility with appropriate resources  11/20/2024 1054 by Maura Monsivais RN  Outcome: Progressing     Problem: Safety - Adult  Goal: Free from fall injury  11/20/2024 1054 by Maura Monsivais RN  Outcome: Progressing     Problem: Skin/Tissue Integrity  Goal: Absence of new skin breakdown  Description: 1.  Monitor for areas of redness and/or skin breakdown  2.  Assess vascular access sites hourly  3.  Every 4-6 hours minimum:  Change oxygen saturation probe site  4.  Every 4-6 hours:  If on nasal continuous positive airway pressure, respiratory therapy assess nares and determine need for appliance change or resting period.  11/20/2024 1054 by Maura Monsivais, RN  Outcome: Progressing

## 2024-11-20 NOTE — PLAN OF CARE
Problem: Chronic Conditions and Co-morbidities  Goal: Patient's chronic conditions and co-morbidity symptoms are monitored and maintained or improved  11/19/2024 2229 by Doreen Fry RN  Outcome: Progressing  Flowsheets (Taken 11/19/2024 2020)  Care Plan - Patient's Chronic Conditions and Co-Morbidity Symptoms are Monitored and Maintained or Improved:   Monitor and assess patient's chronic conditions and comorbid symptoms for stability, deterioration, or improvement   Collaborate with multidisciplinary team to address chronic and comorbid conditions and prevent exacerbation or deterioration   Update acute care plan with appropriate goals if chronic or comorbid symptoms are exacerbated and prevent overall improvement and discharge  11/19/2024 1145 by Lesia Tuttle RN  Outcome: Progressing  Flowsheets (Taken 11/19/2024 0811)  Care Plan - Patient's Chronic Conditions and Co-Morbidity Symptoms are Monitored and Maintained or Improved:   Monitor and assess patient's chronic conditions and comorbid symptoms for stability, deterioration, or improvement   Collaborate with multidisciplinary team to address chronic and comorbid conditions and prevent exacerbation or deterioration     Problem: Discharge Planning  Goal: Discharge to home or other facility with appropriate resources  11/19/2024 2229 by Doreen Fry RN  Outcome: Progressing  Flowsheets (Taken 11/19/2024 2020)  Discharge to home or other facility with appropriate resources:   Identify barriers to discharge with patient and caregiver   Arrange for needed discharge resources and transportation as appropriate   Identify discharge learning needs (meds, wound care, etc)  11/19/2024 1145 by Lesia Tuttle RN  Outcome: Progressing  Flowsheets (Taken 11/19/2024 0811)  Discharge to home or other facility with appropriate resources:   Identify barriers to discharge with patient and caregiver   Arrange for needed discharge resources and transportation as

## 2024-11-21 ENCOUNTER — APPOINTMENT (OUTPATIENT)
Dept: GENERAL RADIOLOGY | Age: 84
DRG: 280 | End: 2024-11-21
Payer: MEDICARE

## 2024-11-21 ENCOUNTER — APPOINTMENT (OUTPATIENT)
Age: 84
DRG: 280 | End: 2024-11-21
Attending: INTERNAL MEDICINE
Payer: MEDICARE

## 2024-11-21 LAB
ALLEN TEST: ABNORMAL
ANION GAP SERPL CALCULATED.3IONS-SCNC: 7 MMOL/L (ref 9–17)
APPEARANCE FLD: CLEAR
BNP SERPL-MCNC: 3481 PG/ML
BODY FLD TYPE: NORMAL
BUN SERPL-MCNC: 64 MG/DL (ref 8–23)
CALCIUM SERPL-MCNC: 9.1 MG/DL (ref 8.6–10.4)
CHLORIDE SERPL-SCNC: 102 MMOL/L (ref 98–107)
CLOT CHECK: NORMAL
CO2 SERPL-SCNC: 37 MMOL/L (ref 20–31)
COLOR FLD: YELLOW
CREAT SERPL-MCNC: 1.6 MG/DL (ref 0.7–1.2)
GFR, ESTIMATED: 43 ML/MIN/1.73M2
GLUCOSE BLD-MCNC: 114 MG/DL (ref 75–110)
GLUCOSE BLD-MCNC: 141 MG/DL (ref 75–110)
GLUCOSE BLD-MCNC: 169 MG/DL (ref 75–110)
GLUCOSE BLD-MCNC: 96 MG/DL (ref 75–110)
GLUCOSE FLD-MCNC: 122 MG/DL
GLUCOSE SERPL-MCNC: 139 MG/DL (ref 70–99)
HCO3 VENOUS: 35.4 MMOL/L (ref 22–29)
INR PPP: 1.2
LDH FLD L TO P-CCNC: 48 U/L
LDH SERPL-CCNC: 215 U/L (ref 135–225)
LYMPHOCYTES NFR FLD: 74 %
MAGNESIUM SERPL-MCNC: 1.8 MG/DL (ref 1.6–2.6)
MESOTHELIAL CELLS BODY FLUID: 2 %
MONOCYTES NFR FLD: 14 %
NEUTROPHILS NFR FLD: 10 %
NUC CELL # FLD: 125 CELLS/UL
O2 SAT, VEN: 98.2 % (ref 60–85)
PCO2 VENOUS: 54 MM HG (ref 41–51)
PH FLUID: 8
PH VENOUS: 7.42 (ref 7.32–7.43)
PO2 VENOUS: 108.8 MM HG (ref 30–50)
POSITIVE BASE EXCESS, VEN: 9.5 MMOL/L (ref 0–3)
POTASSIUM SERPL-SCNC: 4.2 MMOL/L (ref 3.7–5.3)
PROT FLD-MCNC: 1.4 G/DL
PROT SERPL-MCNC: 6.1 G/DL (ref 6.4–8.3)
PROTHROMBIN TIME: 12.9 SEC (ref 9.4–12.6)
RBC # FLD: <3000 CELLS/UL
SODIUM SERPL-SCNC: 146 MMOL/L (ref 135–144)
SPECIMEN TYPE: NORMAL

## 2024-11-21 PROCEDURE — 71045 X-RAY EXAM CHEST 1 VIEW: CPT

## 2024-11-21 PROCEDURE — 36415 COLL VENOUS BLD VENIPUNCTURE: CPT

## 2024-11-21 PROCEDURE — 83735 ASSAY OF MAGNESIUM: CPT

## 2024-11-21 PROCEDURE — 87075 CULTR BACTERIA EXCEPT BLOOD: CPT

## 2024-11-21 PROCEDURE — 80048 BASIC METABOLIC PNL TOTAL CA: CPT

## 2024-11-21 PROCEDURE — 89051 BODY FLUID CELL COUNT: CPT

## 2024-11-21 PROCEDURE — 84157 ASSAY OF PROTEIN OTHER: CPT

## 2024-11-21 PROCEDURE — 2060000000 HC ICU INTERMEDIATE R&B

## 2024-11-21 PROCEDURE — 32554 ASPIRATE PLEURA W/O IMAGING: CPT

## 2024-11-21 PROCEDURE — 0W993ZZ DRAINAGE OF RIGHT PLEURAL CAVITY, PERCUTANEOUS APPROACH: ICD-10-PCS | Performed by: RADIOLOGY

## 2024-11-21 PROCEDURE — 85610 PROTHROMBIN TIME: CPT

## 2024-11-21 PROCEDURE — 0W9B3ZZ DRAINAGE OF LEFT PLEURAL CAVITY, PERCUTANEOUS APPROACH: ICD-10-PCS | Performed by: RADIOLOGY

## 2024-11-21 PROCEDURE — 2709999900 HC NON-CHARGEABLE SUPPLY

## 2024-11-21 PROCEDURE — 99232 SBSQ HOSP IP/OBS MODERATE 35: CPT | Performed by: INTERNAL MEDICINE

## 2024-11-21 PROCEDURE — 94660 CPAP INITIATION&MGMT: CPT

## 2024-11-21 PROCEDURE — 82803 BLOOD GASES ANY COMBINATION: CPT

## 2024-11-21 PROCEDURE — 84155 ASSAY OF PROTEIN SERUM: CPT

## 2024-11-21 PROCEDURE — 2580000003 HC RX 258

## 2024-11-21 PROCEDURE — 6370000000 HC RX 637 (ALT 250 FOR IP)

## 2024-11-21 PROCEDURE — 83880 ASSAY OF NATRIURETIC PEPTIDE: CPT

## 2024-11-21 PROCEDURE — 83615 LACTATE (LD) (LDH) ENZYME: CPT

## 2024-11-21 PROCEDURE — 2580000003 HC RX 258: Performed by: INTERNAL MEDICINE

## 2024-11-21 PROCEDURE — 82945 GLUCOSE OTHER FLUID: CPT

## 2024-11-21 PROCEDURE — 87205 SMEAR GRAM STAIN: CPT

## 2024-11-21 PROCEDURE — 6360000002 HC RX W HCPCS: Performed by: INTERNAL MEDICINE

## 2024-11-21 PROCEDURE — 87070 CULTURE OTHR SPECIMN AEROBIC: CPT

## 2024-11-21 PROCEDURE — 82947 ASSAY GLUCOSE BLOOD QUANT: CPT

## 2024-11-21 PROCEDURE — 83986 ASSAY PH BODY FLUID NOS: CPT

## 2024-11-21 PROCEDURE — 2500000003 HC RX 250 WO HCPCS: Performed by: RADIOLOGY

## 2024-11-21 RX ORDER — SODIUM CHLORIDE 0.9 % (FLUSH) 0.9 %
5-40 SYRINGE (ML) INJECTION PRN
Status: DISCONTINUED | OUTPATIENT
Start: 2024-11-21 | End: 2024-11-26 | Stop reason: HOSPADM

## 2024-11-21 RX ORDER — LIDOCAINE HYDROCHLORIDE 10 MG/ML
INJECTION, SOLUTION EPIDURAL; INFILTRATION; INTRACAUDAL; PERINEURAL PRN
Status: COMPLETED | OUTPATIENT
Start: 2024-11-21 | End: 2024-11-21

## 2024-11-21 RX ORDER — BUMETANIDE 0.25 MG/ML
1 INJECTION, SOLUTION INTRAMUSCULAR; INTRAVENOUS DAILY
Status: DISCONTINUED | OUTPATIENT
Start: 2024-11-22 | End: 2024-11-21

## 2024-11-21 RX ORDER — SODIUM CHLORIDE 9 MG/ML
INJECTION, SOLUTION INTRAVENOUS PRN
Status: DISCONTINUED | OUTPATIENT
Start: 2024-11-21 | End: 2024-11-26 | Stop reason: HOSPADM

## 2024-11-21 RX ORDER — SODIUM CHLORIDE 0.9 % (FLUSH) 0.9 %
5-40 SYRINGE (ML) INJECTION EVERY 12 HOURS SCHEDULED
Status: DISCONTINUED | OUTPATIENT
Start: 2024-11-21 | End: 2024-11-26 | Stop reason: HOSPADM

## 2024-11-21 RX ORDER — BUMETANIDE 0.25 MG/ML
1 INJECTION, SOLUTION INTRAMUSCULAR; INTRAVENOUS 2 TIMES DAILY
Status: DISCONTINUED | OUTPATIENT
Start: 2024-11-21 | End: 2024-11-22

## 2024-11-21 RX ADMIN — BUMETANIDE 1 MG: 0.25 INJECTION INTRAMUSCULAR; INTRAVENOUS at 18:04

## 2024-11-21 RX ADMIN — SODIUM CHLORIDE, PRESERVATIVE FREE 10 ML: 5 INJECTION INTRAVENOUS at 21:25

## 2024-11-21 RX ADMIN — ATORVASTATIN CALCIUM 10 MG: 10 TABLET, FILM COATED ORAL at 21:25

## 2024-11-21 RX ADMIN — IPRATROPIUM BROMIDE 2 SPRAY: 42 SPRAY NASAL at 21:26

## 2024-11-21 RX ADMIN — SODIUM CHLORIDE, PRESERVATIVE FREE 10 ML: 5 INJECTION INTRAVENOUS at 21:32

## 2024-11-21 RX ADMIN — BUMETANIDE 1 MG: 0.25 INJECTION INTRAMUSCULAR; INTRAVENOUS at 12:47

## 2024-11-21 RX ADMIN — LIDOCAINE HYDROCHLORIDE 5 ML: 10 INJECTION, SOLUTION EPIDURAL; INFILTRATION; INTRACAUDAL; PERINEURAL at 11:30

## 2024-11-21 RX ADMIN — LIDOCAINE HYDROCHLORIDE 5 ML: 10 INJECTION, SOLUTION EPIDURAL; INFILTRATION; INTRACAUDAL; PERINEURAL at 11:49

## 2024-11-21 RX ADMIN — SODIUM CHLORIDE, PRESERVATIVE FREE 10 ML: 5 INJECTION INTRAVENOUS at 12:47

## 2024-11-21 RX ADMIN — RIVAROXABAN 15 MG: 15 TABLET, FILM COATED ORAL at 18:05

## 2024-11-21 NOTE — NURSE NAVIGATOR
8106 - Spoke with patient's wife Aida on the phone. Wife is wondering if patient's nephrologist Dr. Wiley could be consulted while patient is in the hospital. RN notified wife that she is unsure if the nephrologist group comes to this hospital, but will pass it on to the dayshift RN so it can be brought up in rounds.

## 2024-11-21 NOTE — PLAN OF CARE
Problem: Chronic Conditions and Co-morbidities  Goal: Patient's chronic conditions and co-morbidity symptoms are monitored and maintained or improved  11/20/2024 2134 by Doreen Fry RN  Outcome: Progressing  Flowsheets (Taken 11/20/2024 2030)  Care Plan - Patient's Chronic Conditions and Co-Morbidity Symptoms are Monitored and Maintained or Improved:   Monitor and assess patient's chronic conditions and comorbid symptoms for stability, deterioration, or improvement   Collaborate with multidisciplinary team to address chronic and comorbid conditions and prevent exacerbation or deterioration   Update acute care plan with appropriate goals if chronic or comorbid symptoms are exacerbated and prevent overall improvement and discharge  11/20/2024 1054 by Maura Monsivais RN  Outcome: Progressing     Problem: Discharge Planning  Goal: Discharge to home or other facility with appropriate resources  11/20/2024 2134 by Doreen rFy RN  Outcome: Progressing  Flowsheets (Taken 11/20/2024 2030)  Discharge to home or other facility with appropriate resources:   Identify barriers to discharge with patient and caregiver   Arrange for needed discharge resources and transportation as appropriate   Identify discharge learning needs (meds, wound care, etc)  11/20/2024 1054 by Maura Monsivais RN  Outcome: Progressing     Problem: Safety - Adult  Goal: Free from fall injury  11/20/2024 2134 by Doreen Fry RN  Outcome: Progressing  Flowsheets (Taken 11/20/2024 2030)  Free From Fall Injury:   Instruct family/caregiver on patient safety   Based on caregiver fall risk screen, instruct family/caregiver to ask for assistance with transferring infant if caregiver noted to have fall risk factors  11/20/2024 1054 by Maura Monsivais RN  Outcome: Progressing     Problem: Skin/Tissue Integrity  Goal: Absence of new skin breakdown  Description: 1.  Monitor for areas of redness and/or skin breakdown  2.  Assess vascular access sites hourly  3.

## 2024-11-22 LAB
ANION GAP SERPL CALCULATED.3IONS-SCNC: 9 MMOL/L (ref 9–17)
BUN SERPL-MCNC: 65 MG/DL (ref 8–23)
CALCIUM SERPL-MCNC: 9.3 MG/DL (ref 8.6–10.4)
CHLORIDE SERPL-SCNC: 99 MMOL/L (ref 98–107)
CO2 SERPL-SCNC: 35 MMOL/L (ref 20–31)
CREAT SERPL-MCNC: 1.5 MG/DL (ref 0.7–1.2)
GFR, ESTIMATED: 46 ML/MIN/1.73M2
GLUCOSE BLD-MCNC: 132 MG/DL (ref 75–110)
GLUCOSE BLD-MCNC: 133 MG/DL (ref 75–110)
GLUCOSE BLD-MCNC: 140 MG/DL (ref 75–110)
GLUCOSE BLD-MCNC: 169 MG/DL (ref 75–110)
GLUCOSE SERPL-MCNC: 182 MG/DL (ref 70–99)
MAGNESIUM SERPL-MCNC: 1.8 MG/DL (ref 1.6–2.6)
POTASSIUM SERPL-SCNC: 4.2 MMOL/L (ref 3.7–5.3)
SODIUM SERPL-SCNC: 143 MMOL/L (ref 135–144)

## 2024-11-22 PROCEDURE — 6370000000 HC RX 637 (ALT 250 FOR IP)

## 2024-11-22 PROCEDURE — 6370000000 HC RX 637 (ALT 250 FOR IP): Performed by: NURSE PRACTITIONER

## 2024-11-22 PROCEDURE — 2060000000 HC ICU INTERMEDIATE R&B

## 2024-11-22 PROCEDURE — 2580000003 HC RX 258

## 2024-11-22 PROCEDURE — 83735 ASSAY OF MAGNESIUM: CPT

## 2024-11-22 PROCEDURE — 97530 THERAPEUTIC ACTIVITIES: CPT

## 2024-11-22 PROCEDURE — 2580000003 HC RX 258: Performed by: INTERNAL MEDICINE

## 2024-11-22 PROCEDURE — 80048 BASIC METABOLIC PNL TOTAL CA: CPT

## 2024-11-22 PROCEDURE — 6360000002 HC RX W HCPCS: Performed by: INTERNAL MEDICINE

## 2024-11-22 PROCEDURE — 97116 GAIT TRAINING THERAPY: CPT

## 2024-11-22 PROCEDURE — 82947 ASSAY GLUCOSE BLOOD QUANT: CPT

## 2024-11-22 PROCEDURE — 97535 SELF CARE MNGMENT TRAINING: CPT

## 2024-11-22 PROCEDURE — 99232 SBSQ HOSP IP/OBS MODERATE 35: CPT | Performed by: INTERNAL MEDICINE

## 2024-11-22 PROCEDURE — 36415 COLL VENOUS BLD VENIPUNCTURE: CPT

## 2024-11-22 RX ORDER — BUMETANIDE 0.25 MG/ML
1.5 INJECTION, SOLUTION INTRAMUSCULAR; INTRAVENOUS 2 TIMES DAILY
Status: DISCONTINUED | OUTPATIENT
Start: 2024-11-22 | End: 2024-11-23

## 2024-11-22 RX ADMIN — SODIUM CHLORIDE, PRESERVATIVE FREE 10 ML: 5 INJECTION INTRAVENOUS at 21:55

## 2024-11-22 RX ADMIN — BUMETANIDE 1 MG: 0.25 INJECTION INTRAMUSCULAR; INTRAVENOUS at 09:05

## 2024-11-22 RX ADMIN — BUMETANIDE 1.5 MG: 0.25 INJECTION INTRAMUSCULAR; INTRAVENOUS at 18:17

## 2024-11-22 RX ADMIN — METOPROLOL SUCCINATE 25 MG: 25 TABLET, EXTENDED RELEASE ORAL at 09:05

## 2024-11-22 RX ADMIN — IPRATROPIUM BROMIDE 2 SPRAY: 42 SPRAY NASAL at 21:51

## 2024-11-22 RX ADMIN — SODIUM CHLORIDE, PRESERVATIVE FREE 10 ML: 5 INJECTION INTRAVENOUS at 09:08

## 2024-11-22 RX ADMIN — IPRATROPIUM BROMIDE 2 SPRAY: 42 SPRAY NASAL at 09:08

## 2024-11-22 RX ADMIN — ATORVASTATIN CALCIUM 10 MG: 10 TABLET, FILM COATED ORAL at 21:51

## 2024-11-22 RX ADMIN — RIVAROXABAN 15 MG: 15 TABLET, FILM COATED ORAL at 18:17

## 2024-11-22 RX ADMIN — SODIUM CHLORIDE, PRESERVATIVE FREE 10 ML: 5 INJECTION INTRAVENOUS at 21:51

## 2024-11-22 NOTE — PLAN OF CARE
Problem: Chronic Conditions and Co-morbidities  Goal: Patient's chronic conditions and co-morbidity symptoms are monitored and maintained or improved  11/22/2024 1808 by Kwan Sampson RN  Outcome: Progressing  Flowsheets (Taken 11/22/2024 0800)  Care Plan - Patient's Chronic Conditions and Co-Morbidity Symptoms are Monitored and Maintained or Improved: Monitor and assess patient's chronic conditions and comorbid symptoms for stability, deterioration, or improvement  11/22/2024 0410 by Oppenheim, Evan, RN  Outcome: Progressing  Flowsheets (Taken 11/21/2024 2017)  Care Plan - Patient's Chronic Conditions and Co-Morbidity Symptoms are Monitored and Maintained or Improved: Collaborate with multidisciplinary team to address chronic and comorbid conditions and prevent exacerbation or deterioration     Problem: Discharge Planning  Goal: Discharge to home or other facility with appropriate resources  11/22/2024 1808 by Kwan Sampson RN  Outcome: Progressing  Flowsheets (Taken 11/22/2024 0800)  Discharge to home or other facility with appropriate resources: Identify barriers to discharge with patient and caregiver  11/22/2024 0410 by Oppenheim, Evan, RN  Outcome: Progressing  Flowsheets (Taken 11/21/2024 2017)  Discharge to home or other facility with appropriate resources: Arrange for needed discharge resources and transportation as appropriate     Problem: Safety - Adult  Goal: Free from fall injury  11/22/2024 1808 by Kwan Sampson RN  Outcome: Progressing  11/22/2024 0410 by Oppenheim, Evan, RN  Outcome: Progressing     Problem: Skin/Tissue Integrity  Goal: Absence of new skin breakdown  Description: 1.  Monitor for areas of redness and/or skin breakdown  2.  Assess vascular access sites hourly  3.  Every 4-6 hours minimum:  Change oxygen saturation probe site  4.  Every 4-6 hours:  If on nasal continuous positive airway pressure, respiratory therapy assess nares and determine need for appliance change or resting

## 2024-11-22 NOTE — PLAN OF CARE
Problem: Chronic Conditions and Co-morbidities  Goal: Patient's chronic conditions and co-morbidity symptoms are monitored and maintained or improved  Outcome: Progressing  Flowsheets (Taken 11/21/2024 2017)  Care Plan - Patient's Chronic Conditions and Co-Morbidity Symptoms are Monitored and Maintained or Improved: Collaborate with multidisciplinary team to address chronic and comorbid conditions and prevent exacerbation or deterioration     Problem: Discharge Planning  Goal: Discharge to home or other facility with appropriate resources  Outcome: Progressing  Flowsheets (Taken 11/21/2024 2017)  Discharge to home or other facility with appropriate resources: Arrange for needed discharge resources and transportation as appropriate     Problem: Safety - Adult  Goal: Free from fall injury  Outcome: Progressing     Problem: Skin/Tissue Integrity  Goal: Absence of new skin breakdown  Description: 1.  Monitor for areas of redness and/or skin breakdown  2.  Assess vascular access sites hourly  3.  Every 4-6 hours minimum:  Change oxygen saturation probe site  4.  Every 4-6 hours:  If on nasal continuous positive airway pressure, respiratory therapy assess nares and determine need for appliance change or resting period.  Outcome: Progressing

## 2024-11-22 NOTE — CONSULTS
[facility]   CONSULT NOTE:      DATE OF CONSULT 11/22/2024    REASON FOR CONSULTATION:   Pulmonary hypertension      PCP Tuan Singh MD     CHIEF COMPLAINT: Extremity swelling, cough      HISTORY OF PRESENT ILLNESS:   Patient is a 84 year old male with best medical history significant for CHF, atrial fibrillation, diabetes mellitus, chronic kidney disease who presented to the ER 3 days ago with upper and lower extremities swelling. He was admitted to the floor for the ER for acute decompensated heart failure.      He also states was having low oxygen saturation at his rehab facility. He was placed on 4 liter nasal cannula during his admission. He also has chronic lymphedema. He has also been having a productive cough with yellow sputum, although denies any chest pain, fever, chills, shortness of breath, abdominal pain, nausea, vomiting.     Used to follow with pulmonology Dr. Sahni. He does have history of obstructive sleep apnea with his last sleep study being about 10 to 15 years ago. He was using a CPAP up until last year when he states he lost weight and he felt like he didn't need any more. He was never a smoker. Denies history of COPD.     During admission he had bilateral thoracentesis with 550 mL removed from the left side and 400 mL removed from the right side.    He had an echo done on 11/19 which showed EF 43%, RVSP 52 mmHg. 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 There was also severe tricuspid regurgitation.     He was also aggressively diuresed. States feels much better today. Swelling is improved.                ALLERGIES:  Allergies   Allergen Reactions    Benadryl [Diphenhydramine]     Cephalosporins     Penicillins        HOME MEDICATIONS:  Medications Prior to Admission: atorvastatin (LIPITOR) 10 MG tablet, Take 1 tablet by mouth daily  allopurinol (ZYLOPRIM) 300 MG tablet, Take 1 tablet by mouth daily  acetaminophen

## 2024-11-23 LAB
ANION GAP SERPL CALCULATED.3IONS-SCNC: 4 MMOL/L (ref 9–17)
BNP SERPL-MCNC: 2770 PG/ML
BUN SERPL-MCNC: 62 MG/DL (ref 8–23)
CALCIUM SERPL-MCNC: 9.3 MG/DL (ref 8.6–10.4)
CHLORIDE SERPL-SCNC: 97 MMOL/L (ref 98–107)
CO2 SERPL-SCNC: 39 MMOL/L (ref 20–31)
CREAT SERPL-MCNC: 1.3 MG/DL (ref 0.7–1.2)
GFR, ESTIMATED: 55 ML/MIN/1.73M2
GLUCOSE BLD-MCNC: 120 MG/DL (ref 75–110)
GLUCOSE BLD-MCNC: 122 MG/DL (ref 75–110)
GLUCOSE BLD-MCNC: 140 MG/DL (ref 75–110)
GLUCOSE BLD-MCNC: 161 MG/DL (ref 75–110)
GLUCOSE BLD-MCNC: 170 MG/DL (ref 75–110)
GLUCOSE SERPL-MCNC: 126 MG/DL (ref 70–99)
MAGNESIUM SERPL-MCNC: 1.8 MG/DL (ref 1.6–2.6)
POTASSIUM SERPL-SCNC: 4.1 MMOL/L (ref 3.7–5.3)
SODIUM SERPL-SCNC: 140 MMOL/L (ref 135–144)

## 2024-11-23 PROCEDURE — 80048 BASIC METABOLIC PNL TOTAL CA: CPT

## 2024-11-23 PROCEDURE — 94761 N-INVAS EAR/PLS OXIMETRY MLT: CPT

## 2024-11-23 PROCEDURE — 05HC33Z INSERTION OF INFUSION DEVICE INTO LEFT BASILIC VEIN, PERCUTANEOUS APPROACH: ICD-10-PCS | Performed by: STUDENT IN AN ORGANIZED HEALTH CARE EDUCATION/TRAINING PROGRAM

## 2024-11-23 PROCEDURE — 6370000000 HC RX 637 (ALT 250 FOR IP)

## 2024-11-23 PROCEDURE — 6360000002 HC RX W HCPCS: Performed by: INTERNAL MEDICINE

## 2024-11-23 PROCEDURE — 2700000000 HC OXYGEN THERAPY PER DAY

## 2024-11-23 PROCEDURE — 83880 ASSAY OF NATRIURETIC PEPTIDE: CPT

## 2024-11-23 PROCEDURE — 82947 ASSAY GLUCOSE BLOOD QUANT: CPT

## 2024-11-23 PROCEDURE — 83735 ASSAY OF MAGNESIUM: CPT

## 2024-11-23 PROCEDURE — 2060000000 HC ICU INTERMEDIATE R&B

## 2024-11-23 PROCEDURE — 2580000003 HC RX 258: Performed by: INTERNAL MEDICINE

## 2024-11-23 PROCEDURE — 2580000003 HC RX 258

## 2024-11-23 PROCEDURE — 36415 COLL VENOUS BLD VENIPUNCTURE: CPT

## 2024-11-23 PROCEDURE — 99232 SBSQ HOSP IP/OBS MODERATE 35: CPT | Performed by: INTERNAL MEDICINE

## 2024-11-23 PROCEDURE — 6370000000 HC RX 637 (ALT 250 FOR IP): Performed by: NURSE PRACTITIONER

## 2024-11-23 RX ORDER — BUMETANIDE 0.25 MG/ML
1.5 INJECTION, SOLUTION INTRAMUSCULAR; INTRAVENOUS 2 TIMES DAILY
Status: DISCONTINUED | OUTPATIENT
Start: 2024-11-24 | End: 2024-11-25

## 2024-11-23 RX ORDER — BUMETANIDE 0.25 MG/ML
1.5 INJECTION, SOLUTION INTRAMUSCULAR; INTRAVENOUS 3 TIMES DAILY
Status: DISCONTINUED | OUTPATIENT
Start: 2024-11-23 | End: 2024-11-23

## 2024-11-23 RX ADMIN — BUMETANIDE 1.5 MG: 0.25 INJECTION INTRAMUSCULAR; INTRAVENOUS at 21:11

## 2024-11-23 RX ADMIN — METOPROLOL SUCCINATE 25 MG: 25 TABLET, EXTENDED RELEASE ORAL at 09:01

## 2024-11-23 RX ADMIN — BUMETANIDE 1.5 MG: 0.25 INJECTION INTRAMUSCULAR; INTRAVENOUS at 10:04

## 2024-11-23 RX ADMIN — RIVAROXABAN 15 MG: 15 TABLET, FILM COATED ORAL at 17:20

## 2024-11-23 RX ADMIN — ATORVASTATIN CALCIUM 10 MG: 10 TABLET, FILM COATED ORAL at 21:10

## 2024-11-23 RX ADMIN — IPRATROPIUM BROMIDE 2 SPRAY: 42 SPRAY NASAL at 22:23

## 2024-11-23 RX ADMIN — SODIUM CHLORIDE, PRESERVATIVE FREE 10 ML: 5 INJECTION INTRAVENOUS at 11:17

## 2024-11-23 RX ADMIN — SODIUM CHLORIDE, PRESERVATIVE FREE 10 ML: 5 INJECTION INTRAVENOUS at 22:24

## 2024-11-23 RX ADMIN — SODIUM CHLORIDE, PRESERVATIVE FREE 10 ML: 5 INJECTION INTRAVENOUS at 11:18

## 2024-11-23 ASSESSMENT — PAIN SCALES - GENERAL: PAINLEVEL_OUTOF10: 0

## 2024-11-23 NOTE — PLAN OF CARE
Problem: Chronic Conditions and Co-morbidities  Goal: Patient's chronic conditions and co-morbidity symptoms are monitored and maintained or improved  11/23/2024 0606 by Oppenheim, Evan, RN  Outcome: Progressing  Flowsheets (Taken 11/22/2024 2150)  Care Plan - Patient's Chronic Conditions and Co-Morbidity Symptoms are Monitored and Maintained or Improved: Monitor and assess patient's chronic conditions and comorbid symptoms for stability, deterioration, or improvement  11/22/2024 1808 by Kwan Sampson RN  Outcome: Progressing  Flowsheets (Taken 11/22/2024 0800)  Care Plan - Patient's Chronic Conditions and Co-Morbidity Symptoms are Monitored and Maintained or Improved: Monitor and assess patient's chronic conditions and comorbid symptoms for stability, deterioration, or improvement     Problem: Discharge Planning  Goal: Discharge to home or other facility with appropriate resources  11/23/2024 0606 by Oppenheim, Evan, RN  Outcome: Progressing  Flowsheets (Taken 11/22/2024 2150)  Discharge to home or other facility with appropriate resources: Arrange for needed discharge resources and transportation as appropriate  11/22/2024 1808 by Kwan Sampson RN  Outcome: Progressing  Flowsheets (Taken 11/22/2024 0800)  Discharge to home or other facility with appropriate resources: Identify barriers to discharge with patient and caregiver     Problem: Safety - Adult  Goal: Free from fall injury  11/23/2024 0606 by Oppenheim, Evan, RN  Outcome: Progressing  11/22/2024 1808 by Kwan Sampson RN  Outcome: Progressing     Problem: Skin/Tissue Integrity  Goal: Absence of new skin breakdown  Description: 1.  Monitor for areas of redness and/or skin breakdown  2.  Assess vascular access sites hourly  3.  Every 4-6 hours minimum:  Change oxygen saturation probe site  4.  Every 4-6 hours:  If on nasal continuous positive airway pressure, respiratory therapy assess nares and determine need for appliance change or resting

## 2024-11-23 NOTE — PLAN OF CARE
Problem: Chronic Conditions and Co-morbidities  Goal: Patient's chronic conditions and co-morbidity symptoms are monitored and maintained or improved  11/23/2024 1847 by Glenis Franz RN  Outcome: Progressing  11/23/2024 0606 by Oppenheim, Evan, RN  Outcome: Progressing  Flowsheets (Taken 11/22/2024 2150)  Care Plan - Patient's Chronic Conditions and Co-Morbidity Symptoms are Monitored and Maintained or Improved: Monitor and assess patient's chronic conditions and comorbid symptoms for stability, deterioration, or improvement     Problem: Discharge Planning  Goal: Discharge to home or other facility with appropriate resources  11/23/2024 1847 by Glenis Franz RN  Outcome: Progressing  11/23/2024 0606 by Oppenheim, Evan, RN  Outcome: Progressing  Flowsheets (Taken 11/22/2024 2150)  Discharge to home or other facility with appropriate resources: Arrange for needed discharge resources and transportation as appropriate     Problem: Safety - Adult  Goal: Free from fall injury  11/23/2024 1847 by Glenis Franz RN  Outcome: Progressing  11/23/2024 0606 by Oppenheim, Evan, RN  Outcome: Progressing     Problem: Skin/Tissue Integrity  Goal: Absence of new skin breakdown  Description: 1.  Monitor for areas of redness and/or skin breakdown  2.  Assess vascular access sites hourly  3.  Every 4-6 hours minimum:  Change oxygen saturation probe site  4.  Every 4-6 hours:  If on nasal continuous positive airway pressure, respiratory therapy assess nares and determine need for appliance change or resting period.  11/23/2024 1847 by Glenis Franz RN  Outcome: Progressing  11/23/2024 0606 by Oppenheim, Evan, RN  Outcome: Progressing

## 2024-11-24 ENCOUNTER — APPOINTMENT (OUTPATIENT)
Dept: GENERAL RADIOLOGY | Age: 84
DRG: 280 | End: 2024-11-24
Payer: MEDICARE

## 2024-11-24 LAB
ANION GAP SERPL CALCULATED.3IONS-SCNC: 8 MMOL/L (ref 9–17)
BUN SERPL-MCNC: 62 MG/DL (ref 8–23)
CALCIUM SERPL-MCNC: 8.9 MG/DL (ref 8.6–10.4)
CHLORIDE SERPL-SCNC: 96 MMOL/L (ref 98–107)
CO2 SERPL-SCNC: 37 MMOL/L (ref 20–31)
CREAT SERPL-MCNC: 1.3 MG/DL (ref 0.7–1.2)
GFR, ESTIMATED: 55 ML/MIN/1.73M2
GLUCOSE BLD-MCNC: 128 MG/DL (ref 75–110)
GLUCOSE BLD-MCNC: 153 MG/DL (ref 75–110)
GLUCOSE BLD-MCNC: 163 MG/DL (ref 75–110)
GLUCOSE BLD-MCNC: 221 MG/DL (ref 75–110)
GLUCOSE SERPL-MCNC: 127 MG/DL (ref 70–99)
POTASSIUM SERPL-SCNC: 4.2 MMOL/L (ref 3.7–5.3)
SODIUM SERPL-SCNC: 141 MMOL/L (ref 135–144)

## 2024-11-24 PROCEDURE — 82947 ASSAY GLUCOSE BLOOD QUANT: CPT

## 2024-11-24 PROCEDURE — 80048 BASIC METABOLIC PNL TOTAL CA: CPT

## 2024-11-24 PROCEDURE — 2580000003 HC RX 258: Performed by: INTERNAL MEDICINE

## 2024-11-24 PROCEDURE — 36415 COLL VENOUS BLD VENIPUNCTURE: CPT

## 2024-11-24 PROCEDURE — 6370000000 HC RX 637 (ALT 250 FOR IP)

## 2024-11-24 PROCEDURE — 6370000000 HC RX 637 (ALT 250 FOR IP): Performed by: NURSE PRACTITIONER

## 2024-11-24 PROCEDURE — 94761 N-INVAS EAR/PLS OXIMETRY MLT: CPT

## 2024-11-24 PROCEDURE — 99232 SBSQ HOSP IP/OBS MODERATE 35: CPT | Performed by: INTERNAL MEDICINE

## 2024-11-24 PROCEDURE — 2580000003 HC RX 258

## 2024-11-24 PROCEDURE — 71045 X-RAY EXAM CHEST 1 VIEW: CPT

## 2024-11-24 PROCEDURE — 6360000002 HC RX W HCPCS: Performed by: INTERNAL MEDICINE

## 2024-11-24 PROCEDURE — 2060000000 HC ICU INTERMEDIATE R&B

## 2024-11-24 PROCEDURE — 2700000000 HC OXYGEN THERAPY PER DAY

## 2024-11-24 RX ORDER — ECHINACEA PURPUREA EXTRACT 125 MG
1 TABLET ORAL PRN
Status: DISCONTINUED | OUTPATIENT
Start: 2024-11-24 | End: 2024-11-26 | Stop reason: HOSPADM

## 2024-11-24 RX ORDER — BUMETANIDE 0.25 MG/ML
2 INJECTION, SOLUTION INTRAMUSCULAR; INTRAVENOUS ONCE
Status: COMPLETED | OUTPATIENT
Start: 2024-11-24 | End: 2024-11-24

## 2024-11-24 RX ADMIN — RIVAROXABAN 15 MG: 15 TABLET, FILM COATED ORAL at 18:09

## 2024-11-24 RX ADMIN — BUMETANIDE 2 MG: 0.25 INJECTION INTRAMUSCULAR; INTRAVENOUS at 15:21

## 2024-11-24 RX ADMIN — METOPROLOL SUCCINATE 25 MG: 25 TABLET, EXTENDED RELEASE ORAL at 08:41

## 2024-11-24 RX ADMIN — BUMETANIDE 1.5 MG: 0.25 INJECTION INTRAMUSCULAR; INTRAVENOUS at 08:40

## 2024-11-24 RX ADMIN — BUMETANIDE 1.5 MG: 0.25 INJECTION INTRAMUSCULAR; INTRAVENOUS at 18:09

## 2024-11-24 RX ADMIN — IPRATROPIUM BROMIDE 2 SPRAY: 42 SPRAY NASAL at 21:19

## 2024-11-24 RX ADMIN — ATORVASTATIN CALCIUM 10 MG: 10 TABLET, FILM COATED ORAL at 21:19

## 2024-11-24 RX ADMIN — SODIUM CHLORIDE, PRESERVATIVE FREE 10 ML: 5 INJECTION INTRAVENOUS at 08:41

## 2024-11-24 RX ADMIN — INSULIN LISPRO 1 UNITS: 100 INJECTION, SOLUTION INTRAVENOUS; SUBCUTANEOUS at 21:19

## 2024-11-24 RX ADMIN — SODIUM CHLORIDE, PRESERVATIVE FREE 10 ML: 5 INJECTION INTRAVENOUS at 21:19

## 2024-11-24 ASSESSMENT — PAIN SCALES - GENERAL: PAINLEVEL_OUTOF10: 0

## 2024-11-24 NOTE — PLAN OF CARE
Problem: Safety - Adult  Goal: Free from fall injury  11/23/2024 2351 by Glenis Medina RN  Outcome: Progressing     Problem: Skin/Tissue Integrity  Goal: Absence of new skin breakdown  Description: 1.  Monitor for areas of redness and/or skin breakdown  2.  Assess vascular access sites hourly  3.  Every 4-6 hours minimum:  Change oxygen saturation probe site  4.  Every 4-6 hours:  If on nasal continuous positive airway pressure, respiratory therapy assess nares and determine need for appliance change or resting period.  11/23/2024 2351 by Glenis Medina, RN  Outcome: Progressing     Problem: Chronic Conditions and Co-morbidities  Goal: Patient's chronic conditions and co-morbidity symptoms are monitored and maintained or improved  11/23/2024 2351 by Glenis Medina RN  Outcome: Progressing     Problem: Discharge Planning  Goal: Discharge to home or other facility with appropriate resources  11/23/2024 2351 by Glenis Medina, RN  Outcome: Progressing

## 2024-11-24 NOTE — PROCEDURES
PROCEDURE NOTE  Date: 11/23/2024   Name: Román Judd  YOB: 1940    Procedures    Midline placement note:   Dynamic Access RN    Prescribed IV Therapy = Bumex  Peripheral ultrasound assessment done. Plan for left basilic vein insertion.   CVR measurement = 34 % (Linear CVR is preferred to be less than 45%).  Product type: Bard 4 fr midline  History/Labs/Allergies Reviewed  Placed By: Tian Wilkes RN (Dynamic Access)  Time out Performed using Two Identifiers  Lot # [id number]  Expiration date = 10/31/2025  Trimmed at 12 cm  External catheter length 0 cm  Number of attempts 1  Special equipment used - Ultrasound and an MST insertion technique  Catheter securement = 3M securement device  Dressing applied= Tegaderm CHG  Lidocaine administered intradermally conc.1%, approx 1ml. (Lot# [id number] and Exp date= 10/31/26)  Device should have blood return, if no blood return assess for infiltration and/or call VAT for consult.  RN aware midline placed and is immediately released for use. No cxr required due to placement of a midline.     Midline education:   [ X ] Post care line insertion was discussed with patient/Family or POA prior to procedure.  Risks, benefits, alternatives, and reason for procedure were discussed and teaching was reinforced. An educational handout on post insertion line care and maintenance was left at bedside with patient or in chart. Patient (Family or POA) acknowledged understanding of information relayed.      [  ] Post care of line insertion was not discussed with patient/family or POA due to pts medical status at time of procedure. Patient's family or POA not available to discuss line education. An educational handout on post insertion line care and maintenance was left at bedside or in chart.        Upload picture of vessel

## 2024-11-25 LAB
ANION GAP SERPL CALCULATED.3IONS-SCNC: 5 MMOL/L (ref 9–17)
BNP SERPL-MCNC: 3615 PG/ML
BUN SERPL-MCNC: 64 MG/DL (ref 8–23)
CALCIUM SERPL-MCNC: 9 MG/DL (ref 8.6–10.4)
CHLORIDE SERPL-SCNC: 97 MMOL/L (ref 98–107)
CO2 SERPL-SCNC: 38 MMOL/L (ref 20–31)
CREAT SERPL-MCNC: 1.3 MG/DL (ref 0.7–1.2)
GFR, ESTIMATED: 55 ML/MIN/1.73M2
GLUCOSE BLD-MCNC: 130 MG/DL (ref 75–110)
GLUCOSE BLD-MCNC: 147 MG/DL (ref 75–110)
GLUCOSE BLD-MCNC: 147 MG/DL (ref 75–110)
GLUCOSE BLD-MCNC: 175 MG/DL (ref 75–110)
GLUCOSE SERPL-MCNC: 135 MG/DL (ref 70–99)
POTASSIUM SERPL-SCNC: 4.1 MMOL/L (ref 3.7–5.3)
SODIUM SERPL-SCNC: 140 MMOL/L (ref 135–144)

## 2024-11-25 PROCEDURE — 36415 COLL VENOUS BLD VENIPUNCTURE: CPT

## 2024-11-25 PROCEDURE — 2060000000 HC ICU INTERMEDIATE R&B

## 2024-11-25 PROCEDURE — 80048 BASIC METABOLIC PNL TOTAL CA: CPT

## 2024-11-25 PROCEDURE — 6370000000 HC RX 637 (ALT 250 FOR IP)

## 2024-11-25 PROCEDURE — 99232 SBSQ HOSP IP/OBS MODERATE 35: CPT | Performed by: INTERNAL MEDICINE

## 2024-11-25 PROCEDURE — 6360000002 HC RX W HCPCS: Performed by: INTERNAL MEDICINE

## 2024-11-25 PROCEDURE — 83880 ASSAY OF NATRIURETIC PEPTIDE: CPT

## 2024-11-25 PROCEDURE — 82947 ASSAY GLUCOSE BLOOD QUANT: CPT

## 2024-11-25 PROCEDURE — 6370000000 HC RX 637 (ALT 250 FOR IP): Performed by: INTERNAL MEDICINE

## 2024-11-25 PROCEDURE — 6370000000 HC RX 637 (ALT 250 FOR IP): Performed by: NURSE PRACTITIONER

## 2024-11-25 PROCEDURE — 2580000003 HC RX 258: Performed by: INTERNAL MEDICINE

## 2024-11-25 RX ORDER — SPIRONOLACTONE 25 MG/1
25 TABLET ORAL DAILY
Status: DISCONTINUED | OUTPATIENT
Start: 2024-11-25 | End: 2024-11-26 | Stop reason: HOSPADM

## 2024-11-25 RX ORDER — BUMETANIDE 1 MG/1
2 TABLET ORAL 2 TIMES DAILY
Status: DISCONTINUED | OUTPATIENT
Start: 2024-11-25 | End: 2024-11-26 | Stop reason: HOSPADM

## 2024-11-25 RX ORDER — BUMETANIDE 0.25 MG/ML
2 INJECTION, SOLUTION INTRAMUSCULAR; INTRAVENOUS 2 TIMES DAILY
Status: DISCONTINUED | OUTPATIENT
Start: 2024-11-25 | End: 2024-11-25

## 2024-11-25 RX ADMIN — ATORVASTATIN CALCIUM 10 MG: 10 TABLET, FILM COATED ORAL at 22:18

## 2024-11-25 RX ADMIN — SPIRONOLACTONE 25 MG: 25 TABLET, FILM COATED ORAL at 14:36

## 2024-11-25 RX ADMIN — RIVAROXABAN 15 MG: 15 TABLET, FILM COATED ORAL at 18:02

## 2024-11-25 RX ADMIN — IPRATROPIUM BROMIDE 2 SPRAY: 42 SPRAY NASAL at 22:18

## 2024-11-25 RX ADMIN — BUMETANIDE 2 MG: 0.25 INJECTION INTRAMUSCULAR; INTRAVENOUS at 10:00

## 2024-11-25 RX ADMIN — IPRATROPIUM BROMIDE 2 SPRAY: 42 SPRAY NASAL at 10:21

## 2024-11-25 RX ADMIN — METOPROLOL SUCCINATE 25 MG: 25 TABLET, EXTENDED RELEASE ORAL at 10:02

## 2024-11-25 RX ADMIN — BUMETANIDE 2 MG: 1 TABLET ORAL at 18:02

## 2024-11-25 RX ADMIN — SODIUM CHLORIDE, PRESERVATIVE FREE 10 ML: 5 INJECTION INTRAVENOUS at 22:18

## 2024-11-25 RX ADMIN — SODIUM CHLORIDE, PRESERVATIVE FREE 10 ML: 5 INJECTION INTRAVENOUS at 10:00

## 2024-11-25 ASSESSMENT — PAIN DESCRIPTION - PAIN TYPE: TYPE: ACUTE PAIN;CHRONIC PAIN

## 2024-11-25 ASSESSMENT — PAIN DESCRIPTION - DESCRIPTORS: DESCRIPTORS: ACHING

## 2024-11-25 ASSESSMENT — PAIN SCALES - GENERAL: PAINLEVEL_OUTOF10: 2

## 2024-11-25 ASSESSMENT — PAIN DESCRIPTION - ORIENTATION: ORIENTATION: RIGHT;LEFT

## 2024-11-25 ASSESSMENT — PAIN DESCRIPTION - LOCATION: LOCATION: LEG

## 2024-11-25 NOTE — PLAN OF CARE
Problem: Chronic Conditions and Co-morbidities  Goal: Patient's chronic conditions and co-morbidity symptoms are monitored and maintained or improved  11/25/2024 0104 by Doreen Glynn, RN  Outcome: Progressing  Flowsheets (Taken 11/24/2024 1947)  Care Plan - Patient's Chronic Conditions and Co-Morbidity Symptoms are Monitored and Maintained or Improved:   Monitor and assess patient's chronic conditions and comorbid symptoms for stability, deterioration, or improvement   Collaborate with multidisciplinary team to address chronic and comorbid conditions and prevent exacerbation or deterioration   Update acute care plan with appropriate goals if chronic or comorbid symptoms are exacerbated and prevent overall improvement and discharge     Problem: Discharge Planning  Goal: Discharge to home or other facility with appropriate resources  11/25/2024 0104 by Doreen Glynn, RN  Outcome: Progressing  Flowsheets (Taken 11/24/2024 1947)  Discharge to home or other facility with appropriate resources:   Identify barriers to discharge with patient and caregiver   Arrange for needed discharge resources and transportation as appropriate   Identify discharge learning needs (meds, wound care, etc)   Refer to discharge planning if patient needs post-hospital services based on physician order or complex needs related to functional status, cognitive ability or social support system     Problem: Safety - Adult  Goal: Free from fall injury  11/25/2024 0104 by Doreen Glynn, RN  Outcome: Progressing     Problem: Skin/Tissue Integrity  Goal: Absence of new skin breakdown  Description: 1.  Monitor for areas of redness and/or skin breakdown  2.  Assess vascular access sites hourly  3.  Every 4-6 hours minimum:  Change oxygen saturation probe site  4.  Every 4-6 hours:  If on nasal continuous positive airway pressure, respiratory therapy assess nares and determine need for appliance change or resting period.  11/25/2024 0104 by

## 2024-11-26 VITALS
HEART RATE: 80 BPM | TEMPERATURE: 97.5 F | OXYGEN SATURATION: 97 % | SYSTOLIC BLOOD PRESSURE: 104 MMHG | HEIGHT: 64 IN | DIASTOLIC BLOOD PRESSURE: 77 MMHG | BODY MASS INDEX: 47.16 KG/M2 | WEIGHT: 276.24 LBS | RESPIRATION RATE: 18 BRPM

## 2024-11-26 LAB
GLUCOSE BLD-MCNC: 146 MG/DL (ref 75–110)
GLUCOSE BLD-MCNC: 153 MG/DL (ref 75–110)

## 2024-11-26 PROCEDURE — 82947 ASSAY GLUCOSE BLOOD QUANT: CPT

## 2024-11-26 PROCEDURE — 6370000000 HC RX 637 (ALT 250 FOR IP): Performed by: INTERNAL MEDICINE

## 2024-11-26 PROCEDURE — 99239 HOSP IP/OBS DSCHRG MGMT >30: CPT | Performed by: STUDENT IN AN ORGANIZED HEALTH CARE EDUCATION/TRAINING PROGRAM

## 2024-11-26 PROCEDURE — 2580000003 HC RX 258: Performed by: INTERNAL MEDICINE

## 2024-11-26 RX ORDER — BUMETANIDE 2 MG/1
2 TABLET ORAL 2 TIMES DAILY
Qty: 30 TABLET | Refills: 3 | Status: SHIPPED | OUTPATIENT
Start: 2024-11-26

## 2024-11-26 RX ORDER — SPIRONOLACTONE 25 MG/1
25 TABLET ORAL DAILY
Qty: 30 TABLET | Refills: 3 | Status: SHIPPED | OUTPATIENT
Start: 2024-11-27

## 2024-11-26 RX ORDER — METOPROLOL SUCCINATE 25 MG/1
25 TABLET, EXTENDED RELEASE ORAL DAILY
Qty: 30 TABLET | Refills: 3 | Status: SHIPPED | OUTPATIENT
Start: 2024-11-27

## 2024-11-26 RX ADMIN — SPIRONOLACTONE 25 MG: 25 TABLET, FILM COATED ORAL at 09:08

## 2024-11-26 RX ADMIN — BUMETANIDE 2 MG: 1 TABLET ORAL at 09:07

## 2024-11-26 RX ADMIN — SODIUM CHLORIDE, PRESERVATIVE FREE 10 ML: 5 INJECTION INTRAVENOUS at 09:12

## 2024-11-26 RX ADMIN — IPRATROPIUM BROMIDE 2 SPRAY: 42 SPRAY NASAL at 09:10

## 2024-11-26 NOTE — PLAN OF CARE
Problem: Chronic Conditions and Co-morbidities  Goal: Patient's chronic conditions and co-morbidity symptoms are monitored and maintained or improved  Outcome: Progressing  Flowsheets  Taken 11/25/2024 1740 by Sam Méndez RN  Care Plan - Patient's Chronic Conditions and Co-Morbidity Symptoms are Monitored and Maintained or Improved:   Monitor and assess patient's chronic conditions and comorbid symptoms for stability, deterioration, or improvement   Collaborate with multidisciplinary team to address chronic and comorbid conditions and prevent exacerbation or deterioration  Taken 11/25/2024 0753 by Sam Méndez RN  Care Plan - Patient's Chronic Conditions and Co-Morbidity Symptoms are Monitored and Maintained or Improved:   Monitor and assess patient's chronic conditions and comorbid symptoms for stability, deterioration, or improvement   Collaborate with multidisciplinary team to address chronic and comorbid conditions and prevent exacerbation or deterioration   Update acute care plan with appropriate goals if chronic or comorbid symptoms are exacerbated and prevent overall improvement and discharge     Problem: Discharge Planning  Goal: Discharge to home or other facility with appropriate resources  Outcome: Progressing  Flowsheets  Taken 11/25/2024 1740 by Sam Méndez RN  Discharge to home or other facility with appropriate resources:   Identify barriers to discharge with patient and caregiver   Arrange for needed discharge resources and transportation as appropriate   Identify discharge learning needs (meds, wound care, etc)   Arrange for interpreters to assist at discharge as needed   Refer to discharge planning if patient needs post-hospital services based on physician order or complex needs related to functional status, cognitive ability or social support system  Taken 11/25/2024 0753 by Sam Méndez RN  Discharge to home or other facility with appropriate resources:   Identify barriers to

## 2024-11-26 NOTE — PROGRESS NOTES
Pulmonary Progress Note  [facility] Pulmonary and Critical Care Specialists  Dr Rogelio Kumar/Dr Jamshid Barragan/Dr Osvaldo Schneider APRN AGACNP-BC  Sherry CRUZ NP-C      Patient - Román Judd,  Age - 84 y.o.    - 1940      Room Number - 347/347-01   [facility] -  [id number]   [facility] # - [id number]  Date of Admission -  2024 10:57 PM        Consulting Service/Physician   Consulting - Yanick Parrish DO  Primary Care Physician - Tuan Singh MD     SUBJECTIVE   Evaluated patient at bedside, patient sitting in bed in no acute distress on 3 L of oxygen via nasal cannula.  Reports that he has no pain and is eating and drinking well without shortness of breath.  Remains unable to ambulate given decreased strength in legs and lower extremity edema.  Reports that he was told that he is going to be discharged today back to [place].  Has no new acute concerns at this time    OBJECTIVE   VITALS    height is 1.626 m (5' 4\") and weight is 125.3 kg (276 lb 3.8 oz). His axillary temperature is 97.5 °F (36.4 °C). His blood pressure is 104/77 and his pulse is 80. His respiration is 18 and oxygen saturation is 97%.     Body mass index is 47.42 kg/m².  Temperature Range: Temp: 97.5 °F (36.4 °C) Temp  Av.8 °F (36.6 °C)  Min: 97.5 °F (36.4 °C)  Max: 98.4 °F (36.9 °C)  BP Range:  Systolic (24hrs), Av , Min:99 , Max:113     Diastolic (24hrs), Av, Min:56, Max:77    Pulse Range: Pulse  Av.6  Min: 66  Max: 81  Respiration Range: Resp  Av.2  Min: 16  Max: 20  Current Pulse Ox::  SpO2: 97 %  24HR Pulse Ox Range:  SpO2  Av.6 %  Min: 91 %  Max: 98 %  Oxygen Amount and Delivery: O2 Flow Rate (L/min): 3 L/min    Wt Readings from Last 3 Encounters:   24 125.3 kg (276 lb 3.8 oz)   23 114.3 kg (252 lb)       I/O (24 Hours)    Intake/Output Summary (Last 24 hours) at 2024 1407  Last data filed at 2024 0610  Gross per 
          [facility] M.D., M.H.A.  Yanick Early M.D., M.B.A.  KENNEDY Roper P.A.C.    [facility]  [address]    Cardio-Oncology Service  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  This is not changed from 2023.  Patient was started on Bumex 2 mg IV twice daily.  Urine output recorded is 1800+700 today  Lab work shows sodium 143, potassium 4, chloride 99, carbon dioxide 35, BUN 66, creatinine 1.6, increased from 1.4  Blood pressures were low overnight.  Remains in atrial fibrillation  He denies feeling short of breath.  He feels lower extremity swelling has improved        VITALS:       Vitals:    11/20/24 0018 11/20/24 0730 11/20/24 0830 11/20/24 1100   BP: (!) 99/59 (!) 108/49  128/73   Pulse: 87 73  73   Resp: 16 18  18   Temp: 98.5 °F (36.9 °C) 97.9 °F (36.6 °C)  98.1 °F (36.7 °C)   TempSrc: Oral Oral  Oral   SpO2: 91% (!) 85% 95% 96%   Weight:       Height:         I/O last 3 completed shifts:  In: -   Out: 1800 [Urine:1800]  I/O this shift:  In: -   Out: 700 [Urine:700]      PHYSICAL EXAM:     General: WDWN in NAD. A and O x 3  HEENT: EOMI, Sclera anicteric, Conjunctiva pink  Neck: Supple, No JVP or HJR, No carotid bruits  Lungs: Diminished breath sounds  Heart: 
          [facility] M.D., M.H.A.  Yanick Early M.D., M.B.A.  KENNEDY Roper P.A.C.    [facility]  [facility]  [address] [address]    Cardio-Oncology Service  [address], [address]  [place] [address]   [facility] [place] [address]  Phone: [phone]   [place] [id number]   Phone: [phone]  Fax: [phone]    [phone]   Fax: [phone]          DAILY HOSPITAL PROGRESS NOTE     # DAYS IN HOSPITAL: 3  ADMIT DATE: 11/18/2024      Primary cardiologist: [facility]  SUBJECTIVE:     Follow up regarding acute on chronic systolic chf/right heart failure  S/p bilateral thoracentesis yesterday with fluid analysis consistent with transudate  Currently lying flat in bed and denies any shortness of breath.            VITALS:       Vitals:    11/21/24 2015 11/22/24 0122 11/22/24 0200 11/22/24 0215   BP: 116/66 111/66     Pulse: 81 97     Resp:  16     Temp: 97.5 °F (36.4 °C) 97.9 °F (36.6 °C)     TempSrc:  Oral     SpO2: 94% 91% (!) 87% 94%   Weight:       Height:         I/O last 3 completed shifts:  In: -   Out: 4400 [Urine:3450; Other:950]  I/O this shift:  In: -   Out: 900 [Urine:900]      PHYSICAL EXAM:     General: WDWN in NAD. A and O x 3, obese  Lungs: decreased BS both bases  Heart: Irregularly irregular, systolic murmurs appreciated  Abdomen: soft non tender, good bowel sounds, No HSM  Ext: Significant lymphedema and stasis changes noted  Vascular: Unable to palpate distal pulses      CURRENT MEDICATIONS:      sodium chloride flush  5-40 mL IntraVENous 2 times per day    bumetanide  1 mg IntraVENous BID    rivaroxaban  15 mg Oral Daily    sodium chloride flush  5-40 mL IntraVENous 2 times per day    atorvastatin  10 mg Oral Daily    ipratropium  2 spray Nasal TID    insulin lispro  0-4 Units SubCUTAneous 4x Daily AC & HS    
          [facility] M.D., M.H.A.  Yanick Early M.D., M.B.A.  KENNEDY Roper P.A.C.    [facility]  [address] [facility]    Cardio-Oncology Service  [address], [address]  [place] [address]   [facility] [place] [address]  Phone: [phone]   [place] [address]   Phone: [phone]  Fax: [phone]    [phone]   Fax: [phone]    Covering for [facility] cardiology      DAILY HOSPITAL PROGRESS NOTE     # DAYS IN HOSPITAL: 4  ADMIT DATE: 11/18/2024        SUBJECTIVE:     Patient admitted for combined acute on chronic systolic and right heart failure with ejection fraction 45% and severe tricuspid valve regurgitation.  Patient was started on IV Bumex and it was adjusted to 1.5 mg 3 times daily per primary medicine.    Patient is status post thoracentesis with 550 cc removed from the left and 400 removed on the right.  Not all urine is able to be recorded as he has Raul wick and has soaked the linen on several occasions.  Recorded urine output 1950.  Weights do not appear to be accurate.  Blood pressure stable.  Creatinine down to 1.3  Patient down to 2 L nasal cannula.  He states his breathing has improved significantly.  Still has swelling.        VITALS:       Vitals:    11/22/24 2315 11/23/24 0816 11/23/24 0920 11/23/24 1102   BP: 105/63 118/76  117/71   Pulse: 69 84  88   Resp: 17 18  19   Temp: 97.9 °F (36.6 °C)  97.5 °F (36.4 °C) 97.8 °F (36.6 °C)   TempSrc: Oral  Axillary Axillary   SpO2: 99% 99%  97%   Weight: 127.5 kg (281 lb 1.4 oz)      Height:         I/O last 3 completed shifts:  In: 720 [P.O.:720]  Out: 2850 [Urine:2850]  No intake/output data recorded.      PHYSICAL EXAM:     General: Weak and appears chronically ill  HEENT: EOMI, Sclera anicteric, Conjunctiva pink  Neck: Supple, No JVP or HJR, No carotid bruits  Lungs: Clear 
          [facility] M.D., M.H.A.  Yanick Early M.D., M.B.A.  KENNEDY Roper P.A.C.    [facility]  [address] [facility]    Cardio-Oncology Service  [address], [address]  [place] [address]   [facility] [place] [address]  Phone: [phone]   [place] [address]   Phone: [phone]  Fax: [phone]    [phone]   Fax: [phone]          DAILY HOSPITAL PROGRESS NOTE     # DAYS IN HOSPITAL: 6  ADMIT DATE: 11/18/2024        SUBJECTIVE:     Follow-up visit patient admitted with acute on chronic systolic congestive heart failure moderate LV dysfunction and PAF.  Patient also has severe pulmonary hypertension tricuspid regurgitation and chronic kidney disease.    Patient reports he does not feel short of breath.  He has conversational dyspnea however.    Episodic hypotension noted        VITALS:       Vitals:    11/24/24 1634 11/24/24 2126 11/25/24 0113 11/25/24 0753   BP: (!) 98/55 (!) 109/58 115/62 (!) 106/59   Pulse: 85 81 88 68   Resp: 16 18 18 18   Temp: 97.8 °F (36.6 °C) 97.7 °F (36.5 °C) 97.9 °F (36.6 °C) 97.7 °F (36.5 °C)   TempSrc: Oral Oral Oral Axillary   SpO2: 94% 93% 96% 100%   Weight:       Height:         I/O last 3 completed shifts:  In: 1320 [P.O.:1320]  Out: 1275 [Urine:1275]  No intake/output data recorded.      PHYSICAL EXAM:     General: Lying flat.  Mild tachypnea  HEENT: EOMI, Sclera anicteric, Conjunctiva pink  Neck: Supple, No JVP or HJR, No carotid bruits  Lungs: Clear but diminished   heart: RRR  Abdomen: soft non tender, good bowel sounds, No HSM  Ext obese positive cellulitic changes and dry skin.  Positive edema         CURRENT MEDICATIONS:      bumetanide  2 mg IntraVENous BID    sodium chloride flush  5-40 mL IntraVENous 2 times per day    rivaroxaban  15 mg Oral Daily    sodium chloride flush  5-40 mL 
          [facility] M.D., M.H.A.  Yanick Early M.D., M.B.A.  KENNEDY Roper P.A.C.    [facility]  [address] [facility] [address] [facility]    Cardio-Oncology Service  [address], [address]  [place] [address]   [address] [place] [address]  Phone: [phone]   [place] [address]   Phone: [phone]  Fax: [phone]    [phone]   Fax: [phone]          DAILY HOSPITAL PROGRESS NOTE     # DAYS IN HOSPITAL: 2  ADMIT DATE: 11/18/2024        SUBJECTIVE:     Cardiology continuing to follow for acute on chronic combined systolic/diastolic right-sided heart failure.  Echocardiogram this admission shows ejection fraction 40 to 45%: Septal flattening consistent with right heart failure with small pericardial effusion.  Noted to have severe tricuspid valvular dictation and at least moderate pulmonary pretension.  Overall unchanged from echocardiogram in 2023.    Spoke with patient today in clinic.  Reports she has continued lower extremity edema, abdominal swelling.  Denies chest pain or chest pressure.  Does report shortness of breath.    Patient is scheduled for thoracentesis today.    He is currently on Bumex 1 mg IV daily, kidney function near his baseline.    Remains on Xarelto for anticoagulation metoprolol for rate control of his atrial fibrillation.  Blood pressures have been borderline soft today.    Spoke with hospitalist regarding patient's case.  Will need outpatient pulmonary evaluation for pulm hypertension and obstructive sleep apnea.    ROS      VITALS:       Vitals:    11/21/24 0048 11/21/24 0418 11/21/24 0554 11/21/24 0913   BP: 113/76 124/68  107/64   Pulse: 74 72  65   Resp: 16 16     Temp: 97.7 °F (36.5 °C) 97.7 °F (36.5 °C)  98.2 °F (36.8 °C)   TempSrc: Oral Oral     SpO2: 94% 95%  94%   Weight:   126 kg (277 lb 12.5 oz)    Height:     
    [facility] PULMONARY,CRITICAL CARE & SLEEP   Jamshid Connell MD/Rogelio CRUZ AGACNP-BC, NP-C      Sherry CRUZ NP-C    Mando CRUZ NP-C                                         Pulmonary Progress Note    Patient - Román Judd   Age - 84 y.o.   - 1940  MRN - [id number]  Acct # - [id number]  Date of Admission - 2024 10:57 PM    Consulting Service/Physician:       Primary Care Physician: Tuan Singh MD    SUBJECTIVE:     Chief Complaint:   Chief Complaint   Patient presents with    Shortness of Breath    Edema     SNF called EMS for lower extremity swelling     Subjective:    He states he is doing fair today, he has no complaints for me.  He is still on 3 L however his pulse ox has been in the high 90s.  He continues on Bumex 1.5 mg 3 times daily, pleural cultures were negative, he is -8 L since admission.  His bicarbonate is slowly climbing into the high 30s now, prior to admission was in the mid 20s.    VITALS  /71   Pulse 88   Temp 97.8 °F (36.6 °C) (Axillary)   Resp 19   Ht 1.626 m (5' 4\")   Wt 127.5 kg (281 lb 1.4 oz)   SpO2 97%   BMI 48.25 kg/m²   Wt Readings from Last 3 Encounters:   24 127.5 kg (281 lb 1.4 oz)   23 114.3 kg (252 lb)     I/O (24 Hours)    Intake/Output Summary (Last 24 hours) at 2024 1415  Last data filed at 2024 0619  Gross per 24 hour   Intake 240 ml   Output 1750 ml   Net -1510 ml     Ventilator:   Settings  FiO2 : 32 %  Insp Rise Time (%): 3 %  Exam:   Physical Exam   Constitutional: Elderly male, lying in bed, no apparent distress, alert and appropriate  HENT: Unremarkable, nasal cannula in place  Head: Normocephalic and atraumatic.   Eyes: EOM are normal. Pupils are equal, round, and reactive to light.   Neck: Neck supple.   Cardiovascular:  Regular rate and rhythm.  Normal heart tones.  No JVD.    Pulmonary/Chest: Lung sounds clear anteriorly, slightly diminished, no cough 
    [facility] PULMONARY,CRITICAL CARE & SLEEP   Jamshidheydi Connell MD/Rogelio CRUZ AGACNP-BC, NP-C      Sherry CRUZ NP-C    Mando CRUZ NP-C                                         Pulmonary Progress Note    Patient - Román Jdud   Age - 84 y.o.   - 1940  MRN - [id number]  Acct # - [id number]  Date of Admission - 2024 10:57 PM    Consulting Service/Physician:       Primary Care Physician: Tuan Singh MD    SUBJECTIVE:     Chief Complaint:   Chief Complaint   Patient presents with    Shortness of Breath    Edema     SNF called EMS for lower extremity swelling     Subjective:    He states his breathing is okay today, he did have his oxygen off for a while while was in the room, I did spot check him his pulse ox was 90%, he has been 96% on 2 L.  He denies any pleuritic pain, he does have a congested cough bring up clear phlegm.  He has not had any fevers.  Blood pressure has been on the soft side.  He denies any dizziness or lightheadedness.    He continues to be in a negative fluid balance down 9 L since admission    VITALS  BP 99/71   Pulse 58   Temp 97.7 °F (36.5 °C) (Oral)   Resp 16   Ht 1.626 m (5' 4\")   Wt 125.3 kg (276 lb 3.8 oz)   SpO2 (!) 85%   BMI 47.42 kg/m²   Wt Readings from Last 3 Encounters:   24 125.3 kg (276 lb 3.8 oz)   23 114.3 kg (252 lb)     I/O (24 Hours)    Intake/Output Summary (Last 24 hours) at 2024 1406  Last data filed at 2024 0349  Gross per 24 hour   Intake 240 ml   Output 925 ml   Net -685 ml     Ventilator:   Settings  FiO2 : 32 %  Insp Rise Time (%): 3 %  Exam:   Physical Exam   Constitutional: Elderly male, lying in bed, no apparent distress, alert and appropriate  HENT: Unremarkable, nasal cannula on his forehead, while he blows his nose  Head: Normocephalic and atraumatic.   Eyes: EOM are normal. Pupils are equal, round, and reactive to light.   Neck: Neck supple.   Cardiovascular: 
   11/22/24 0108   NIV Type   NIV Started/Stopped Off  (note pt showing signs of central apnea- does not like alarms- refusing NIV- alarms adjusted yet pt not tolerating NIV noices)     NIV BIPAP -10/5, rate 12, 32%- Good mask seal but pt showing low Vte at times <100.  Unable to increase pressures or rate.  Alarms adjusted lower Vte and MV yet pt pulled mask off.         
  Physician Progress Note      PATIENT:               KATALINA KEARNS  [facility] #:                  [id number]  :                       1940  ADMIT DATE:       2024 10:57 PM  DISCH DATE:  RESPONDING  PROVIDER #:        Kemal Cardona DO          QUERY TEXT:    Patient admitted with CHF exacerbation. Noted documentation of demand ischemia   in initial IM H&P and cardiology consult note and type II MI in H&P addendum.   If possible, please document in progress notes and discharge summary if you   are evaluating and /or treating any of the following:    The medical record reflects the following:  Risk Factors: CHF exacerbation  Clinical Indicators: per initial IM H&P \"Elevated troponin-Likely consequence   of demand ischemia. Low suspicion for ACS\", per cardiology consult note   \"Elevated troponin: Nonspecific and likely secondary to CHF from demand   ischemia\", per H&P addendum \"Troponin elevation likely due to type II MI.   Troponins flat, doubt ACS. No chest pain. Likely due to heart failure\".   troponins 97-88, EKG Afib with ST now depressed in Inferior and anterior leads  Treatment: EKG, troponins, cardiology consult, CXR, echo, IV Bumex, Beta   Blocker, SGLT2 inhibitor    Fourth Universal Definition of Myocardial Infarction:  Clearly separates MI   from myocardial injury. Patients with elevated blood troponin levels but   without clinical evidence of ischemia are said to have had a myocardial   injury.? To have a myocardial infarction requires both an elevated troponin   blood test along with at least one of the following:  - Symptoms of acute myocardial ischemia (Types 1 - 5 MI)  - Clinical evidence of ischemia, as evidenced in an electrocardiogram (EKG)   showing new ischemic changes (Type 1, Type 2, Type 3, or Type 4a MI)  - Development of pathological Q waves (Types 1 - 5 MI)  - Imaging evidence of new loss of viable myocardium or new regional wall   motion abnormality in a pattern consistent with 
Access RN notified of midline order. Stated they will be here in 20 minutes to place.   
[facility]  Office: [phone]  Jewel Armando DO, Chandrakant Tran DO, Gopal Giles DO, Garett Chapman DO, Rhina Renee MD, Melissa Mccain MD, Albert Terrazas MD, Isabella Clay MD,  Brandon Rushing MD, Melanie Sharma MD, Natacha Tamayo MD,  Kemal Cardona DO, Leonarda Cole MD, Donald Wyatt MD, All Armando DO, Evelina Casiano MD,  Ian Chahal DO, Elisha Dumont MD, Sofiya Fermin MD, Karolina Sampson MD, Allison Arauz MD,  Hitesh Camacho MD, Kanu Michele MD, Christina Coleman MD, Samson Farmer MD, Sathish Casillas MD, Maury Thurston MD, Yanick Parrish DO, Main Vila MD, Shirley Waterhouse, CNP,  Nalini Vivas, ROSANGELA, Yanick Samuels, ROSANGELA,  Brook Cervantes, JAZLYN, Beatrice Self, CNP, Mecca Avendano, CNP, Radha Desai, CNP, Carolina Tee, CNP, Yolanda Pate PA-C, Angela Pandya PA-C, Shaista Umanzor, CNP, Sigifredo Kennedy, CNP,  Celina Mark, CNP, Alyssa Camargo, CNP,  Therese Hung, CNP, Kathryn Cobb, CNP         [facility]   IN-PATIENT SERVICE   [facility]    Progress Note    11/22/2024    2:05 PM    Name:   Román Judd  MRN:     [id number]     Acct:      [id number]   Room:   [address] [place] [address] Day:  3  Admit Date:  11/18/2024 10:57 PM    PCP:   Tuan Singh MD  Code Status:  Full Code    Subjective:   Patient feels a little bit better today.  -7 L since admission.  Had thoracentesis with removal about 500 cc of fluid from each lung.  He denies any chest pain or cough.  Still has some pitting edema in the abdomen as well as scrotal edema.  No fevers or chills.  Still on 4 L of ox via nasal cannula.  Creatinine is stable      Brief History:     This is an 84-year-old male who presents the hospital for evaluation of shortness of breath new oxygen requirement while at rehab.  On admission patient noted to have significant edema and his lower extremities, thighs and back.  He does have chronic lymphedema as well.  Echocardiogram previously showed an EF of 40 to 45% with 
[facility]  Office: [phone]  Jewel Armando DO, Chandrakant Tran DO, Gopal Giles DO, Garett Chapman DO, Rhina Renee MD, Melissa Mccain MD, Albert Terrazas MD, Isabella Clay MD,  Brandon Rushing MD, Melanie Sharma MD, Natacha Tamayo MD,  Kemal Cardona DO, Leonarda Cole MD, Donald Wyatt MD, All Armando DO, Evelina Casiano MD,  Ian Chahal DO, Elisha Dumont MD, Sofiya Fermin MD, Karolina Sampson MD, Allison Arauz MD,  Hitesh Camacho MD, Kanu Michele MD, Christina Coleman MD, Samson Farmer MD, Sathish Casillas MD, Maury Thurston MD, Yanick Parrish DO, Main Vila MD, Shirley Waterhouse, CNP,  Nalini Vivas, CNP, Yanick Samuels, CNP,  Brook Cervantes, JAZLYN, Beatrice Self, CNP, Mecca Avendano, CNP, Radha Desai, CNP, Carolina Tee, CNP, BRADY NixonC, Angela Pandya PA-C, Shaista Umanzor, CNP, Sigifredo Kennedy, CNP,  Celina Mark, CNP, Alyssa Camargo, CNP,  Therese Hung, CNP, Kathryn Cobb, CNP         [facility]   IN-PATIENT SERVICE   [facility]    Progress Note    11/21/2024    7:50 AM    Name:   Román Judd  MRN:     [id number]     Acct:      [id number]   Room:   [address] [place] [address] Day:  2  Admit Date:  11/18/2024 10:57 PM    PCP:   Tuan Singh MD  Code Status:  Full Code    Subjective:     Patient looks a little worse today compared to yesterday.  Still having very significant edema in abdomen, back, scrotum thighs and legs.  He is on 2 L of oxygen via nasal cannula.  Creatinine is 1.6 which is near his baseline. He did not sleep well last night.       Brief History:     This is an 84-year-old male who presents the hospital for evaluation of shortness of breath new oxygen requirement while at rehab.  On admission patient noted to have significant edema and his lower extremities, thighs and back.  He does have chronic lymphedema as well.  Echocardiogram previously showed an EF of 40 to 45% with grade 2 diastolic dysfunction.  Repeat echocardiogram 
[facility]  Office: [phone]  Jewel Armando DO, Chandrakant Tran, DO, Gopal Giles DO, Garett Chapman DO, Rhina Renee MD, Melissa Mccain MD, Albert Terrazas MD, Isabella Clay MD,  Brandon Rushing MD, Melanie Sharma MD, Natacha Tamayo MD,  Kemal Cardona DO, Leonarda Cole MD, Donald Wyatt MD, All Armando DO, Evelina Casiano MD,  Ian Chahal DO, Elisha Dumont MD, Sofiya Fermin MD, Karolina Sampson MD, Allison Arauz MD,  Hitesh Camacho MD, Kanu Michele MD, Christina Coleman MD, Samson Farmer MD, Sathish Casillas MD, Maury Thurston MD, Yanick Parrish DO, Main Vila MD, Shirley Waterhouse, CNP,  Nalini Vivas CNP, Yanick Samuels, ROSANGELA,  Brook Cervantes, JAZLYN, Beatrice Self, CNP, Mecca Avendano, CNP, Radha Desai, CNP, Carolina Tee, CNP, BRADY NixonC, BRADY ChildressC, Shaista Umanzor, CNP, Sigifredo Kennedy, CNP,  Celina Mark, CNP, Alyssa Camargo, CNP,  Therese Hung, CNP, Kathryn Cobb, CNP         [facility]   IN-PATIENT SERVICE   [facility]    Progress Note    11/24/2024    2:43 PM    Name:   Román Judd  MRN:     [id number]     Acct:      [id number]   Room:   [address] [place] [address] Day:  5  Admit Date:  11/18/2024 10:57 PM    PCP:   Tuan Singh MD  Code Status:  Full Code    Subjective:   Patient feels a little bit better today.  -9.0 L since admission.  He feels better today compared to yesterday.  Still having some significant edema but feels like his breathing is a little bit easier    SPO2 did drop to 85% on room air    He has not gotten out of bed so far today.  Creatinine has improved    Brief History:     This is an 84-year-old male who presents the hospital for evaluation of shortness of breath new oxygen requirement while at rehab.  On admission patient noted to have significant edema and his lower extremities, thighs and back.  He does have chronic lymphedema as well.  Echocardiogram previously showed an EF of 40 to 45% with grade 2 diastolic 
[facility]  Office: [phone]  Jewel Armando DO, Chandrakant Tran DO, Gopal Giles DO, Garett Chapman DO, Rhina Renee MD, Melissa Mccian MD, Albert Terrazas MD, Isabella Clay MD,  Brandon Rushing MD, Melanie Sharma MD, Natacha Tamayo MD,  Kemal Cardona DO, Leonarda Cole MD, Donald Wyatt MD, All Armando DO, Evelina Casiano MD,  Ian Chahal DO, Elisha Dumont MD, Sofiya Fermin MD, Karolina Sampson MD, Allison Arauz MD,  Hitesh Camacho MD, Kanu Michele MD, Christina Coleman MD, Samson Farmer MD, Sathish Casillas MD, Maury Thurston MD, Yanick Parrish DO, Main Vila MD, Shirley Waterhouse, CNP,  Nalini Vivas CNP, Yanick Samuels, ROSANGELA,  Brook Cervantes, JAZLYN, Beatrice Self, CNP, Mecca Avendano, CNP, Radha Desai, CNP, Carolina Tee, CNP, BRADY NixonC, BRADY ChildressC, Shaista Umanzor, CNP, Sigifredo Kennedy, CNP,  Celina Mark, CNP, Alyssa Camargo, CNP,  Therese Hung, CNP, Kathryn Cobb, CNP         [facility]   IN-PATIENT SERVICE   [facility]    Progress Note    11/23/2024    2:08 PM    Name:   Román Judd  MRN:     [id number]     Acct:      [id number]   Room:   [address] [place] [address] Day:  4  Admit Date:  11/18/2024 10:57 PM    PCP:   Tuan Singh MD  Code Status:  Full Code    Subjective:   Patient feels a little bit better today.  -8.3 L since admission.  He feels better today compared to yesterday.  Still having some significant edema but feels like his breathing is a little bit easier    He has not gotten out of bed so far today.  Creatinine has improved    Brief History:     This is an 84-year-old male who presents the hospital for evaluation of shortness of breath new oxygen requirement while at rehab.  On admission patient noted to have significant edema and his lower extremities, thighs and back.  He does have chronic lymphedema as well.  Echocardiogram previously showed an EF of 40 to 45% with grade 2 diastolic dysfunction.  Repeat echocardiogram 
[facility]  Consult Note       NAME:  Román Judd  MEDICAL RECORD NUMBER:  [id number]  AGE: 84 y.o.   GENDER: male  : 1940  TODAY'S DATE:  2024    Subjective:      Román Judd is a 84 y.o. male with inpatient referral to [facility] for:  Wound, BLE calves      Wound Identification:  Wound Type: venous  Contributing Factors: edema, lymphedema, and decreased mobility    Wound History: patient from  where he has resided for 2 months. Receives wound care at , he states that he had followed at [facility] previously.   Current Wound Care Treatment:  ACE wrap and oil gauze dressings in place at time of assessment    Patient Goal of Care:  [x] Wound Healing  [] Odor Control  [] Palliative Care  [] Pain Control   [] Other:         PAST MEDICAL HISTORY        Diagnosis Date    Arthritis     Atrial fibrillation (HCC)     CHF (congestive heart failure) (HCC)     Diabetes mellitus (HCC)     Hyperlipidemia     Prostate CA (HCC)        PAST SURGICAL HISTORY    Past Surgical History:   Procedure Laterality Date    JOINT REPLACEMENT Right     TONSILLECTOMY         FAMILY HISTORY    History reviewed. No pertinent family history.    SOCIAL HISTORY    Social History     Tobacco Use    Smoking status: Never    Smokeless tobacco: Never   Substance Use Topics    Alcohol use: Yes         ALLERGIES    Allergies   Allergen Reactions    Benadryl [Diphenhydramine]     Cephalosporins     Penicillins        HOME MEDICATIONS  Prior to Admission medications    Medication Sig Start Date End Date Taking? Authorizing Provider   atorvastatin (LIPITOR) 10 MG tablet Take 1 tablet by mouth daily   Yes Nicky Murphy MD   allopurinol (ZYLOPRIM) 300 MG tablet Take 1 tablet by mouth daily 10/27/09   Nicky Murphy MD   acetaminophen (TYLENOL) 500 MG tablet Take 1 tablet by mouth every 4 hours 22   Nicky Murphy MD   bumetanide (BUMEX) 1 MG tablet Take 1 
Occupational Therapy    [facility]  Occupational Therapy Not Seen Note    DATE: 2024    NAME: Román Judd  MRN: [id number]   : 1940      Patient not seen this date for Occupational Therapy due to:    Surgery/Procedure: Pt currently SEAN for thoracentesis. Will continue to follow for OT as appropriate.     Next Scheduled Treatment:  PM or 24    Electronically signed by Finn Reynolds OT on 2024 at 11:02 AM    
Occupational Therapy    [facility]  Occupational Therapy Not Seen Note    DATE: 2024    NAME: Román Judd  MRN: [id number]   : 1940      Patient not seen this date for Occupational Therapy due to:    Patient Declined: Pt refusal to participated in Therapy this date, stated he is still eating breakfast (tray untouched), RN reported Pt was sat up at ~10:00 AM to eat. Will continue to follow for OT tx.     Next Scheduled Treatment: 2024    Electronically signed by ADRIENNE RENDON on 2024 at 11:19 AM    
Occupational Therapy  Occupational Therapy Daily Treatment Note  Facility/Department: [address]   Patient Name: Román Judd        MRN: [id number]    : 1940    Date of Service: 2024    Discharge Recommendations  Discharge Recommendations: Patient would benefit from continued therapy after discharge       Chief Complaint   Patient presents with    Shortness of Breath    Edema     SNF called EMS for lower extremity swelling     Past Medical History:  has a past medical history of Arthritis, Atrial fibrillation (HCC), CHF (congestive heart failure) (HCC), Diabetes mellitus (HCC), Hyperlipidemia, and Prostate CA (HCC).  Past Surgical History:  has a past surgical history that includes joint replacement (Right) and Tonsillectomy.    Assessment  Performance deficits / Impairments: Decreased functional mobility ;Decreased strength;Decreased endurance;Decreased balance;Decreased posture;Decreased ADL status  Assessment: Pt presents to OT this date with above noted deficits. Pt is not currently functioning at [facility]. At this  time pt is MAX-DEP x2 bed mobility, MAX A x2 sit<>stand with RW and is not currently safe to return to [facility]. It is recommended that Pt recieve OT services during hospitalization and after discharge to increase safety/ADLs for safe return to previous environment.  Prognosis: Fair  REQUIRES OT FOLLOW-UP: Yes  Activity Tolerance  Activity Tolerance: Patient Tolerated treatment well  Activity Tolerance Comments: Pt standing ~15 seconds and ~30 seconds for 2 trials total ranging MIN-MOD A x2 standing balance  Safety Devices  Type of Devices: Call light within reach;Bed alarm in place;Gait belt;Left in bed;Nurse notified  Restraints  Restraints Initially in Place: No    Restrictions/Precautions  Restrictions/Precautions  Restrictions/Precautions: General Precautions  Required Braces or Orthoses?: No  Position Activity Restriction  Other position/activity restrictions: Up with assistance, LLE 
Occupational Therapy Initial Evaluation  Facility/Department: [address]   Patient Name: Román Judd        MRN: [id number]    : 1940    Date of Service: 2024    Discharge Recommendations  Discharge Recommendations: Patient would benefit from continued therapy after discharge     Chief Complaint   Patient presents with    Shortness of Breath    Edema     SNF called EMS for lower extremity swelling     Past Medical History:  has a past medical history of Arthritis, Atrial fibrillation (HCC), CHF (congestive heart failure) (HCC), Diabetes mellitus (HCC), Hyperlipidemia, and Prostate CA (HCC).  Past Surgical History:  has a past surgical history that includes joint replacement (Right) and Tonsillectomy.    Assessment  Performance deficits / Impairments: Decreased functional mobility ;Decreased strength;Decreased endurance;Decreased balance;Decreased posture;Decreased ADL status  Assessment: Pt presents to OT with impairments in endurance, balance, strength, and activity tolerance, impacting pt ability to complete ADL, IADL, and ADL mobility. Pt currently dependent for all bed mobility, supine<>sit, able to sit EOB with grossly CGA but with decreased stability noted. Attempted standing wtih walker and then iveth steady but pt unable to clear bottom off bed with 2 person assist, unable to complete sit<>Stands and not safe for further transfers. Discussed with nursing staff about tej lift use for transfers at this time. At this time pt would benefit from OT to address impairments and to ensure optimal safety and independence.  Prognosis: Fair  Decision Making: High Complexity  REQUIRES OT FOLLOW-UP: Yes  Activity Tolerance  Activity Tolerance: Patient Tolerated treatment well  Safety Devices  Type of Devices: Patient at risk for falls;All fall risk precautions in place;Left in bed;Bed alarm in place;Gait belt;Call light within reach  Restraints  Restraints Initially in Place: 
Physical Therapy          Physical Therapy Cancel Note      DATE: 2024    NAME: Román Judd  MRN: [id number]   : 1940      Patient not seen this date for Physical Therapy due to:    Pt refused PT this morning at 11:31 am as he was still eating breakfast and did not want to participate in therapy. PT will follow up.       Electronically signed by RO Shi on 2024 at 12:27 PM   Evaluation/treatment performed by Student PT under the supervision of co-signing PT who agrees with all evaluation/treatment and documentation.    
Physical Therapy        Physical Therapy Cancel Note      DATE: 2024    NAME: Román Judd  MRN: [id number]   : 1940      Patient not seen this date for Physical Therapy due to:    Per RN, pt set up for breakfast at 1000. Therapy entered room at 1115, pt's tray untouched and pt refused PT due to still eating. Encouraged pt to participate in therapy due to lunch being delivered in ~45'. Pt adamantly refuses despite max encouragement. Will continue to pursue as pt agreeable.       Electronically signed by RYLEY SANTA, PT on 2024 at 11:21 AM     
Physical Therapy  Facility/Department: [address]  Physical Therapy Initial Assessment    Name: Román Judd  : 1940  MRN: [id number]  Date of Service: 2024    Discharge Recommendations:  Patient would benefit from continued therapy after discharge   PT Equipment Recommendations  Equipment Needed: No      Patient Diagnosis(es): The primary encounter diagnosis was Congestive heart failure, unspecified HF chronicity, unspecified heart failure type (HCC). Diagnoses of Hypoxia, Anasarca, and Shortness of breath were also pertinent to this visit.  Past Medical History:  has a past medical history of Arthritis, Atrial fibrillation (HCC), CHF (congestive heart failure) (HCC), Diabetes mellitus (HCC), Hyperlipidemia, and Prostate CA (HCC).  Past Surgical History:  has a past surgical history that includes joint replacement (Right) and Tonsillectomy.    Assessment  Body Structures, Functions, Activity Limitations Requiring Skilled Therapeutic Intervention: Decreased functional mobility ;Decreased ADL status;Decreased endurance;Increased pain;Decreased balance;Decreased posture;Decreased ROM;Decreased body mechanics;Decreased strength;Decreased tolerance to work activity    Assessment: Pt reports to ER with shortness of breath and BLE edema. Pt has been at [facility] since September, and states he has rarely been ambulating since his admission. Pt needed assistance with most ADLs. Prior to admission at [facility], pt lived at home with his wife and was able to ambulate with RW. During treatment, pt refused to leave supine position in bed, and agreed to just doing exercises in supine. Pt showed increased LLE strength/AROM compared to evaluation during supine ther ex, but still very limited. Pt would benefit from continuation of skilled physical therapy in order to improve strength and ROM to improve functional mobility with transfers. Pt will be appropriate to return to prior living conditions once pt 
Physical Therapy  Facility/Department: [address]  Physical Therapy Initial Assessment    Name: Román Judd  : 1940  MRN: [id number]  Date of Service: 2024    Discharge Recommendations:  Patient would benefit from continued therapy after discharge   PT Equipment Recommendations  Equipment Needed: No  Chief Complaint   Patient presents with    Shortness of Breath    Edema     SNF called EMS for lower extremity swelling          Patient Diagnosis(es): The primary encounter diagnosis was Congestive heart failure, unspecified HF chronicity, unspecified heart failure type (HCC). Diagnoses of Hypoxia, Anasarca, and Shortness of breath were also pertinent to this visit.  Past Medical History:  has a past medical history of Arthritis, Atrial fibrillation (HCC), CHF (congestive heart failure) (HCC), Diabetes mellitus (HCC), Hyperlipidemia, and Prostate CA (HCC).  Past Surgical History:  has a past surgical history that includes joint replacement (Right) and Tonsillectomy.    Assessment  Body Structures, Functions, Activity Limitations Requiring Skilled Therapeutic Intervention: Decreased functional mobility ;Decreased ADL status;Decreased endurance;Increased pain;Decreased balance;Decreased posture;Decreased ROM;Decreased body mechanics;Decreased strength;Decreased tolerance to work activity    Assessment: Pt reports to ER with shortness of breath and BLE edema. Pt has been at [facility] since September, and states he has rarely been ambulating since his admission. Pt needed assistance with most ADLs. Prior to admission at [facility], pt lived at home with his wife and was able to ambulate with RW. During evaluation, pt was DEPx2 for bed mobility and DEPx2 for transfers with RW x2 and Larissa Stedy x2. During all four transfers, pt was unable to get body off of bed despite max cues. Pt would benefit from continuation of skilled physical therapy in order to improve strength and ROM to improve functional 
Physical Therapy  Facility/Department: [address]  Physical Therapy Treatment Note    Name: Román Judd  : 1940  MRN: [id number]  Date of Service: 2024    Discharge Recommendations:  Patient would benefit from continued therapy after discharge   PT Equipment Recommendations  Equipment Needed: No      Patient Diagnosis(es): The primary encounter diagnosis was Congestive heart failure, unspecified HF chronicity, unspecified heart failure type (HCC). Diagnoses of Hypoxia, Anasarca, and Shortness of breath were also pertinent to this visit.  Past Medical History:  has a past medical history of Arthritis, Atrial fibrillation (HCC), CHF (congestive heart failure) (HCC), Diabetes mellitus (HCC), Hyperlipidemia, and Prostate CA (HCC).  Past Surgical History:  has a past surgical history that includes joint replacement (Right) and Tonsillectomy.    Assessment  Body Structures, Functions, Activity Limitations Requiring Skilled Therapeutic Intervention: Decreased functional mobility ;Decreased ADL status;Decreased endurance;Increased pain;Decreased balance;Decreased posture;Decreased ROM;Decreased body mechanics;Decreased strength;Decreased tolerance to work activity    Assessment: Pt reports to ER with shortness of breath and BLE edema. Pt has been at [facility] since September, and states he has rarely been ambulating since his admission. Pt needed assistance with most ADLs. Prior to admission at [place], pt lived at home with his wife and was able to ambulate with RW. During treatment, pt required maxAx2-DEPx2 for bed mobility and transfers. Pt attempted to stand with Larissa Plus. Pt was unable to do this as pt's body slid down while the arms were raising d/t decreased BUE strength. Pt attempted a stand pivot transfer to the [facility]. Pt was able to stand up with maxAx2 and take one step with L foot. Pt stood for ~30 seconds and was unable to complete transfer d/t fatigue and pain. Pt would benefit 
Pt refused intermittent bipap   Pt educated and risk factors provided about bipap for sleep apnea. Pt verbally acknowledged education and continues to refuse.   Monitoring patient through continuous pulse oximetry with patient O2 @ 2 Liters NC saturation 92%  
Pt requested nurse crush his extended-release meds. Nurse educated on contraindication for crushing extended release meds. Pt states he cannot swallow whole pills.   
Report called to Deirdre at [place]. Patient has no questions at this time. Report given to [facility]. IV removed without complication. Patient discharged with all their belongings via stretcher to the care of [facility] transportation for transportation to [place].      
[facility]  Office: [phone]  Jewel Armando DO, Chandrakant Tran DO, Gopal Giles DO, Garett Chapman DO, Rhina Renee MD, Melissa Mccain MD, Albert Terrazas MD, Isabella Clay MD,  Brandon Rushing MD, Melanie Sharma MD, Natacha Tamayo MD,  Kemal Cardona DO, Leonarda Cole MD, Donald Wyatt MD, All Armando DO, Evelina Casiano MD,  Ian Chahal DO, Elisha Dumont MD, Sofiya Fermin MD, Karolina Sampson MD, Allison Arauz MD,  Hitesh Camacho MD, Kanu Michele MD, Christina Coleman MD, Samson Farmer MD, Sathish Casillas MD, Maury Thurston MD, Yanick Parrish DO, Main Vila MD, Shirley Waterhouse, CNP,  Nalini Vivas, CNP, Yanick Samuels, CNP,  Brook Cervantes, JAZLYN, Beatrice Self, CNP, Mecca Avendano, CNP, Radha Desai, CNP, Carolina Tee, CNP, Yolanda Pate PAChungC, BRADY ChildressC, Shaista Umanzor, CNP, Sigifredo Kennedy, CNP,  Celina Mark, CNP, Alyssa Camargo, CNP,  Therese Hung, CNP, Kathryn Cobb, CNP         [facility]   IN-PATIENT SERVICE   [facility]    Progress Note    11/20/2024    1:22 PM    Name:   Román Judd  MRN:     [id number]     Acct:      [id number]   Room:   [address] [place] [address] Day:  1  Admit Date:  11/18/2024 10:57 PM    PCP:   Tuan Singh MD  Code Status:  Full Code    Subjective:     Pt seems more improved today still having some swelling in his back but better than yesterday. -2500 cc since admission. This morning was hypotensive but seems to be doing better. He is now on 2 liters compared to three liters previously       Brief History:     This is an 84-year-old male who presents the hospital for evaluation of shortness of breath new oxygen requirement while at rehab.  On admission patient noted to have significant edema and his lower extremities, thighs and back.  He does have chronic lymphedema as well.  Echocardiogram previously showed an EF of 40 to 45% with grade 2 diastolic dysfunction    Medications:     Allergies:    Allergies 
Spiritual Health History and Assessment/Progress Note  [facility]    (P) Initial Encounter, Spiritual/Emotional Needs (Pt. engrossed in football game; wanted short visit),  ,  ,      Name: Román Judd MRN: [id number]    Age: 84 y.o.     Sex: male   Language: English   Hindu: Other   Acute decompensated heart failure (HCC)     Date: 11/23/2024            Total Time Calculated: (P) 5 min              Spiritual Assessment began in [facility]        Referral/Consult From: (P) Rounding   Encounter Overview/Reason: (P) Initial Encounter, Spiritual/Emotional Needs (Pt. engrossed in football game; wanted short visit)  Service Provided For: (P) Patient    Marilu, Belief, Meaning:   Patient unable to assess at this time  Family/Friends No family/friends present      Importance and Influence:  Patient unable to assess at this time  Family/Friends No family/friends present    Community:  Patient Other: Unknown to brief nature of visit  Family/Friends No family/friends present    Assessment and Plan of Care:     Patient Interventions include: Provided sacramental/Islam ritual  Family/Friends Interventions include: No family/friends present    Patient Plan of Care: Spiritual Care available upon further referral  Family/Friends Plan of Care: No family/friends present    Electronically signed by Chaplain Yamini on 11/23/2024 at 8:22 PM       11/23/24 2020   Encounter Summary   Encounter Overview/Reason Initial Encounter;Spiritual/Emotional Needs  (Pt. engrossed in football game; wanted short visit)   Service Provided For Patient   Referral/Consult From [facility]   Support System Unknown   Last Encounter  11/23/24   Complexity of Encounter Low   Begin Time 2015   End Time  2020   Total Time Calculated 5 min   Spiritual/Emotional needs   Type Spiritual Support   Assessment/Intervention/Outcome   Assessment Calm;Coping;Hopeful;Peaceful   Intervention Prayer (assurance of)/Hale;Sustaining 
TRANSFER - OUT REPORT:    Verbal report given to Monisha ENGEL       
Initially in Place: No    Restrictions/Precautions  Restrictions/Precautions  Restrictions/Precautions: General Precautions  Required Braces or Orthoses?: No  Position Activity Restriction  Other position/activity restrictions: Up with assistance, LLE swelling    Subjective  General  Patient assessed for rehabilitation services?: Yes  Family / Caregiver Present: No  Subjective  Subjective: RN & Pt agreeable to OT this date. Pt reports pain in B posterior LEs not reporting pain number. Pt repositioned for comfort with Pt able to continue with session.    Objective  Orientation  Overall Orientation Status: Within Normal Limits  Orientation Level: Oriented X4  Cognition  Overall Cognitive Status: WNL  Cognition Comment: Increased encouragement for participation this date with Pt declining OOB activity.    Activities of Daily Living  Additional Comments: Pt denies OOB activity and ADLs agreeable to BUE HEP    Transfers/Mobility  Bed mobility  Bed Mobility Comments: Pt denied bed mobility this date    Transfers  Transfer Comments: SAMMIE    Patient Education  Patient Education  Education Given To: Patient  Education Provided: Role of Therapy;Plan of Care;Home Exercise Program  Education Provided Comments: OT role/POC, BUE HEP    Goals  Short Term Goals  Time Frame for Short Term Goals: 14 visits  Short Term Goal 1: Pt will completed seated grooming with Mod I  Short Term Goal 2: Pt will complete transfers via walker with Mod A  Short Term Goal 3: Pt will complete LE dressing with use of LB AE with Min A  Short Term Goal 4: Pt will complete UB dressing seated with Mod I  Short Term Goal 5: Pt will complete BUE exercises to address strength and endurance in preparation for ADL completion and transfers.    Plan  Occupational Therapy Plan  Times Per Week: 5-6x/wk  Times Per Day: Once a day  Current Treatment Recommendations: Strengthening, Balance training, Functional mobility training, Endurance training, Safety education & 
dilatation with reduced systolic function, severe tricuspid valve regurgitation with moderately elevated RVSP.    Medications:     Allergies:    Allergies   Allergen Reactions    Benadryl [Diphenhydramine]     Cephalosporins     Penicillins        Current Meds:   Scheduled Meds:    bumetanide  2 mg IntraVENous BID    sodium chloride flush  5-40 mL IntraVENous 2 times per day    rivaroxaban  15 mg Oral Daily    sodium chloride flush  5-40 mL IntraVENous 2 times per day    atorvastatin  10 mg Oral Daily    ipratropium  2 spray Nasal TID    insulin lispro  0-4 Units SubCUTAneous 4x Daily AC & HS    metoprolol succinate  25 mg Oral Daily     Continuous Infusions:    sodium chloride      sodium chloride      dextrose       PRN Meds: sodium chloride, sodium chloride flush, sodium chloride, sodium chloride flush, sodium chloride, ondansetron **OR** ondansetron, polyethylene glycol, acetaminophen **OR** acetaminophen, potassium chloride **OR** potassium alternative oral replacement **OR** potassium chloride, magnesium sulfate, perflutren lipid microspheres, glucose, dextrose bolus **OR** dextrose bolus, glucagon (rDNA), dextrose    Data:     Vitals:  BP (!) 106/59   Pulse 68   Temp 97.7 °F (36.5 °C) (Axillary)   Resp 18   Ht 1.626 m (5' 4\")   Wt 125.3 kg (276 lb 3.8 oz)   SpO2 100%   BMI 47.42 kg/m²   Temp (24hrs), Av.8 °F (36.6 °C), Min:97.7 °F (36.5 °C), Max:98 °F (36.7 °C)    Recent Labs     24  0844 24  1320 24  1737 24  2113   POCGLU 128* 153* 163* 221*       I/O (24Hr):    Intake/Output Summary (Last 24 hours) at 2024 0829  Last data filed at 2024 0037  Gross per 24 hour   Intake 840 ml   Output 700 ml   Net 140 ml       Labs:  Hematology:  No results for input(s): \"WBC\", \"RBC\", \"HGB\", \"HCT\", \"MCV\", \"MCH\", \"MCHC\", \"RDW\", \"PLT\", \"MPV\", \"SEDRATE\", \"CRP\", \"INR\", \"DDIMER\", \"JT8EUDUI\", \"LABABSO\" in the last 72 hours.    Invalid input(s): \"PT\"    Chemistry:  Recent Labs     
\"MODE\", \"SETTIDVOL\", \"SETPEEP\"  Ionized Calcium:  No components found for: \"IONCA\"  Magnesium:    Lab Results   Component Value Date/Time    MG 1.8 11/23/2024 05:39 AM     Phosphorus:  No results found for: \"PHOS\"     LIVER PROFILE No results for input(s): \"AST\", \"ALT\", \"LIPASE\", \"AMYLASE\", \"BILIDIR\", \"BILITOT\", \"ALKPHOS\" in the last 72 hours.    Invalid input(s): \"ALB\"  INR No results for input(s): \"INR\" in the last 72 hours.  PTT   Lab Results   Component Value Date    APTT 28.8 04/21/2023         RADIOLOGY     No additional imaging today    ASSESSMENT/PLAN   Acute hypoxic respiratory failure, requiring up to 4 L, now down to 3 L secondary to fluid volume excess  Acute on chronic heart failure with reduced EF, EF 43%  Pulmonary hypertension who group 2 and 3, RVSP 52  Severe tricuspid regurgitation with moderate aortic regurgitation  Bilateral pleural effusions status postthoracentesis with IR 11/21/2024, 550 mL removed on the left and 400 mL removed on the right, negative cultures, transudative   Obstructive sleep apnea, prior sleep study 10 to 15 years ago stopped using CPAP with weight loss  Lymphedema  Chronic kidney disease stage IIIa  Normocytic anemia  Volume contracture alkalosis-  No intrinsic lung disease  Debilitated  Full code  PLAN:      Continuing diuresis, patient is down 8.6 L since admission  Eventual plan to be evaluated by structural heart clinic  Wean O2 to keep sats greater than 88%, he has tolerated weaning to 2 L and is currently satting in the mid 90s  Likely will need outpatient sleep study to obtain CPAP or BiPAP    Electronically signed by Roland Gusman DO on 11/25/2024 at 11:37 AM       
assistance  Bed Mobility Comments: Bed mobility was maxAx2-DEPx2 and took increased time and effort.      Transfers  Sit to Stand: Maximum Assistance;2 Person Assistance  Stand to Sit: Maximum Assistance;2 Person Assistance  Comment: Pt was able to complete 2 sit to stand transfers with RW and maxAx2. Pt cued to take steps while standing, but pt was unable.      Ambulation  Comments: Non-ambulatory at this time       Balance  Sitting - Static: Poor;+  Sitting - Dynamic: Poor  Standing - Static: Poor;-  Standing - Dynamic: Poor;-  Comments: Pt was able to stand twice. First stand lasted ~15 seconds with RW and modAx2, and second stand lasted ~30 seconds with RW and minAx2.      AM-PAC - Mobility    AM-PAC Basic Mobility - Inpatient   How much help is needed turning from your back to your side while in a flat bed without using bedrails?: Total  How much help is needed moving from lying on your back to sitting on the side of a flat bed without using bedrails?: Total  How much help is needed moving to and from a bed to a chair?: Total  How much help is needed standing up from a chair using your arms?: Total  How much help is needed walking in hospital room?: Total  How much help is needed climbing 3-5 steps with a railing?: Total  AM-PAC Inpatient Mobility Raw Score : 6  AM-PAC Inpatient T-Scale Score : 23.55  Mobility Inpatient CMS 0-100% Score: 100  Mobility Inpatient CMS G-Code Modifier : CN      Goals  Short Term Goals  Time Frame for Short Term Goals: 14 visits  Short Term Goal 1: Pt to complete bed mobility with modA  Short Term Goal 2: Pt to complete transfers with modA  Short Term Goal 3: Pt to stand for 45 seconds with modA and RW  Patient Goals   Patient Goals : To reduce swelling in BLEs       Education  Patient Education  Education Given To: Patient  Education Provided: Role of Therapy;Plan of Care;Transfer Training  Education Provided Comments: Pt edu on progression of transfers and progression of therapy. 
diabetes mellitus type 2  Continue Jardiance and insulin sliding scale.  Obesity BMI of 42 due to excess calorie  Recommend weight loss lifestyle modification      Yanick Parrish DO  11/26/2024  12:50 PM

## 2024-11-26 NOTE — DISCHARGE SUMMARY
MG tablet  Commonly known as: LIPITOR     ipratropium 0.06 % nasal spray  Commonly known as: ATROVENT     Januvia 25 MG tablet  Generic drug: SITagliptin     rivaroxaban 15 MG Tabs tablet  Commonly known as: XARELTO     * triamcinolone 0.1 % cream  Commonly known as: KENALOG     * triamcinolone 0.1 % ointment  Commonly known as: KENALOG     vitamin D 50 MCG (2000 UT) Caps capsule  Commonly known as: CHOLECALCIFEROL           * This list has 2 medication(s) that are the same as other medications prescribed for you. Read the directions carefully, and ask your doctor or other care provider to review them with you.                STOP taking these medications      losartan 100 MG tablet  Commonly known as: COZAAR     NIFEdipine 30 MG extended release tablet  Commonly known as: PROCARDIA XL               Where to Get Your Medications        You can get these medications from any pharmacy    Bring a paper prescription for each of these medications  bumetanide 2 MG tablet  metoprolol succinate 25 MG extended release tablet  spironolactone 25 MG tablet         Discharge Procedure Orders   [facility]   Referral Priority: Routine Referral Type: Eval and Treat   Referral Reason: Specialty Services Required   Number of Visits Requested: 1     Sleep Study with PAP Titration   Standing Status: Future Standing Exp. Date: 03/22/25   Order Comments: Dr. Chavarria to interpret sleep study     Order Specific Question Answer Comments   Sleep Study Titration Type Split Night Study (Baseline followed by PAP Titration)        Time Spent on discharge is  35 mins in patient examination, evaluation, counseling as well as medication reconciliation, prescriptions for required medications, discharge plan and follow up.    Electronically signed by   Yanick Parrish DO  11/26/2024  12:55 PM      Thank you Tuan Lutz MD for the opportunity to be involved in this patient's care.

## 2024-11-26 NOTE — CARE COORDINATION
Called [facility] to make sure bed would be available for patient if he is discharged tomorrow.  Patient is being switched from IV meds to PO.  Karina at [facility] says authorization is good through tomorrow, 11/25 and they should be able to accept.  
[facility] Quality Flow/Interdisciplinary Rounds Progress Note    Quality Flow Rounds held on November 21, 2024 at 0930    Disciplines Attending:  Bedside Nurse, , and Nursing Unit Leadership    Barriers to Discharge: Clinical status    Anticipated Discharge Date:   11/22/24    Anticipated Discharge Disposition: SNF    Readmission Risk              Risk of Unplanned Readmission:  16           Discussed patient goal for the day, patient clinical progression, and barriers to discharge. Plan for IR thoracentesis today and possible discharge tomorrow. Patient has approved [facility] precert to return to [facility].      Daica Castro RN  November 21, 2024  
[facility] reviewing patient for authorization at this time.    1146-Spoke with Karina at [facility] and authorization is good until 11/26.  
PT/OT working with patient when CM attempted to see for transitional planning. Initial transitional assessment to be completed at a later time. Patient admitted from [facility]. Return referral sent.    Abimael BRASHER spoke with Landy from [facility] and she states that patient was there private pay and that he is able to return at discharge. Patient has not yet paid for November and he will need to pay for the month up front prior to returning.  
Social work faxed to admissions and to building at [facility].[place]  
Transport request sent  for 1600.    1505- Patient, patient's nurse and facility informed of  at 1530 by SPEEDY.  Mati served Dr. Parrish to fill out ABELARDO.    1512-Call Report to [phone], AVS faxed to [facility].  
care/goals and shares the quality data associated with the providers was provided to: Patient   Patient Representative Name:       The Patient and/or Patient Representative Agree with the Discharge Plan? Yes    Dacia Castro RN  Case Management Department

## 2024-11-27 LAB
MICROORGANISM SPEC CULT: NORMAL
MICROORGANISM/AGENT SPEC: NORMAL
SPECIMEN DESCRIPTION: NORMAL

## 2024-12-04 ENCOUNTER — HOSPITAL ENCOUNTER (OUTPATIENT)
Age: 84
Setting detail: SPECIMEN
Discharge: HOME | End: 2024-12-04

## 2024-12-04 LAB
ANION GAP SERPL CALCULATED.3IONS-SCNC: 11 MMOL/L (ref 9–16)
BASOPHILS # BLD: <0.03 K/UL (ref 0–0.2)
BASOPHILS NFR BLD: 1 % (ref 0–2)
BNP SERPL-MCNC: 5461 PG/ML (ref 0–450)
BUN SERPL-MCNC: 73 MG/DL (ref 8–23)
CALCIUM SERPL-MCNC: 9.6 MG/DL (ref 8.8–10.2)
CHLORIDE SERPL-SCNC: 96 MMOL/L (ref 98–107)
CO2 SERPL-SCNC: 35 MMOL/L (ref 20–31)
CREAT SERPL-MCNC: 1.6 MG/DL (ref 0.7–1.2)
EOSINOPHIL # BLD: 0.31 K/UL (ref 0–0.44)
EOSINOPHILS RELATIVE PERCENT: 9 % (ref 1–4)
ERYTHROCYTE [DISTWIDTH] IN BLOOD BY AUTOMATED COUNT: 17.6 % (ref 11.8–14.4)
GFR, ESTIMATED: 43 ML/MIN/1.73M2
GLUCOSE SERPL-MCNC: 150 MG/DL (ref 82–115)
HCT VFR BLD AUTO: 31.4 % (ref 40.7–50.3)
HGB BLD-MCNC: 9.5 G/DL (ref 13–17)
IMM GRANULOCYTES # BLD AUTO: 0 K/UL (ref 0–0.3)
IMM GRANULOCYTES NFR BLD: 0 %
LYMPHOCYTES NFR BLD: 0.92 K/UL (ref 1.1–3.7)
LYMPHOCYTES RELATIVE PERCENT: 26 % (ref 24–43)
MCH RBC QN AUTO: 30.8 PG (ref 25.2–33.5)
MCHC RBC AUTO-ENTMCNC: 30.3 G/DL (ref 28.4–34.8)
MCV RBC AUTO: 101.9 FL (ref 82.6–102.9)
MONOCYTES NFR BLD: 0.52 K/UL (ref 0.1–1.2)
MONOCYTES NFR BLD: 15 % (ref 3–12)
NEUTROPHILS NFR BLD: 49 % (ref 36–65)
NEUTS SEG NFR BLD: 1.73 K/UL (ref 1.5–8.1)
NRBC BLD-RTO: 0 PER 100 WBC
PLATELET # BLD AUTO: 161 K/UL (ref 138–453)
PMV BLD AUTO: 10.7 FL (ref 8.1–13.5)
POTASSIUM SERPL-SCNC: 4.6 MMOL/L (ref 3.7–5.3)
RBC # BLD AUTO: 3.08 M/UL (ref 4.21–5.77)
RBC # BLD: ABNORMAL 10*6/UL
SODIUM SERPL-SCNC: 142 MMOL/L (ref 136–145)
WBC OTHER # BLD: 3.5 K/UL (ref 3.5–11.3)

## 2024-12-04 PROCEDURE — 36415 COLL VENOUS BLD VENIPUNCTURE: CPT

## 2024-12-04 PROCEDURE — 85025 COMPLETE CBC W/AUTO DIFF WBC: CPT

## 2024-12-04 PROCEDURE — 80048 BASIC METABOLIC PNL TOTAL CA: CPT

## 2024-12-04 PROCEDURE — 83880 ASSAY OF NATRIURETIC PEPTIDE: CPT

## 2024-12-11 ENCOUNTER — HOSPITAL ENCOUNTER (OUTPATIENT)
Age: 84
Setting detail: SPECIMEN
Discharge: HOME | End: 2024-12-11
Payer: MEDICARE

## 2024-12-11 LAB
ANION GAP SERPL CALCULATED.3IONS-SCNC: 9 MMOL/L (ref 9–16)
BNP SERPL-MCNC: 3527 PG/ML (ref 0–450)
BUN SERPL-MCNC: 72 MG/DL (ref 8–23)
CALCIUM SERPL-MCNC: 9.5 MG/DL (ref 8.8–10.2)
CHLORIDE SERPL-SCNC: 97 MMOL/L (ref 98–107)
CO2 SERPL-SCNC: 31 MMOL/L (ref 20–31)
CREAT SERPL-MCNC: 1.7 MG/DL (ref 0.7–1.2)
ERYTHROCYTE [DISTWIDTH] IN BLOOD BY AUTOMATED COUNT: 18.4 % (ref 11.8–14.4)
GFR, ESTIMATED: 40 ML/MIN/1.73M2
GLUCOSE SERPL-MCNC: 94 MG/DL (ref 82–115)
HCT VFR BLD AUTO: 32.5 % (ref 40.7–50.3)
HGB BLD-MCNC: 9.5 G/DL (ref 13–17)
MCH RBC QN AUTO: 30.5 PG (ref 25.2–33.5)
MCHC RBC AUTO-ENTMCNC: 29.2 G/DL (ref 28.4–34.8)
MCV RBC AUTO: 104.5 FL (ref 82.6–102.9)
NRBC BLD-RTO: 0 PER 100 WBC
PLATELET # BLD AUTO: 157 K/UL (ref 138–453)
PMV BLD AUTO: 10.8 FL (ref 8.1–13.5)
POTASSIUM SERPL-SCNC: 4.7 MMOL/L (ref 3.7–5.3)
RBC # BLD AUTO: 3.11 M/UL (ref 4.21–5.77)
SODIUM SERPL-SCNC: 138 MMOL/L (ref 136–145)
WBC OTHER # BLD: 4.1 K/UL (ref 3.5–11.3)

## 2024-12-11 PROCEDURE — 80048 BASIC METABOLIC PNL TOTAL CA: CPT

## 2024-12-11 PROCEDURE — 85027 COMPLETE CBC AUTOMATED: CPT

## 2024-12-11 PROCEDURE — P9603 ONE-WAY ALLOW PRORATED MILES: HCPCS

## 2024-12-11 PROCEDURE — 36415 COLL VENOUS BLD VENIPUNCTURE: CPT

## 2024-12-11 PROCEDURE — 83880 ASSAY OF NATRIURETIC PEPTIDE: CPT

## 2024-12-24 ENCOUNTER — HOSPITAL ENCOUNTER (OUTPATIENT)
Age: 84
Setting detail: SPECIMEN
Discharge: HOME | End: 2024-12-24

## 2024-12-24 LAB
ALBUMIN SERPL-MCNC: 3.3 G/DL (ref 3.5–5.2)
ALBUMIN/GLOB SERPL: 1.3 {RATIO} (ref 1–2.5)
ALP SERPL-CCNC: 65 U/L (ref 40–129)
ALT SERPL-CCNC: 12 U/L (ref 10–50)
ANION GAP SERPL CALCULATED.3IONS-SCNC: 9 MMOL/L (ref 9–16)
AST SERPL-CCNC: 34 U/L (ref 10–50)
BILIRUB SERPL-MCNC: 0.9 MG/DL (ref 0–1.2)
BUN SERPL-MCNC: 74 MG/DL (ref 8–23)
CALCIUM SERPL-MCNC: 9.4 MG/DL (ref 8.8–10.2)
CHLORIDE SERPL-SCNC: 97 MMOL/L (ref 98–107)
CO2 SERPL-SCNC: 36 MMOL/L (ref 20–31)
CREAT SERPL-MCNC: 1.8 MG/DL (ref 0.7–1.2)
GFR, ESTIMATED: 38 ML/MIN/1.73M2
GLUCOSE SERPL-MCNC: 104 MG/DL (ref 82–115)
POTASSIUM SERPL-SCNC: 4.4 MMOL/L (ref 3.7–5.3)
PROT SERPL-MCNC: 5.8 G/DL (ref 6.6–8.7)
SODIUM SERPL-SCNC: 143 MMOL/L (ref 136–145)

## 2024-12-24 PROCEDURE — P9603 ONE-WAY ALLOW PRORATED MILES: HCPCS

## 2024-12-24 PROCEDURE — 36415 COLL VENOUS BLD VENIPUNCTURE: CPT

## 2024-12-24 PROCEDURE — 80053 COMPREHEN METABOLIC PANEL: CPT

## 2024-12-27 ENCOUNTER — HOSPITAL ENCOUNTER (OUTPATIENT)
Age: 84
Setting detail: SPECIMEN
Discharge: HOME | End: 2024-12-27

## 2024-12-27 LAB
ANION GAP SERPL CALCULATED.3IONS-SCNC: 10 MMOL/L (ref 9–16)
BUN SERPL-MCNC: 81 MG/DL (ref 8–23)
CALCIUM SERPL-MCNC: 9.6 MG/DL (ref 8.8–10.2)
CHLORIDE SERPL-SCNC: 95 MMOL/L (ref 98–107)
CO2 SERPL-SCNC: 36 MMOL/L (ref 20–31)
CREAT SERPL-MCNC: 1.8 MG/DL (ref 0.7–1.2)
ERYTHROCYTE [DISTWIDTH] IN BLOOD BY AUTOMATED COUNT: 18.9 % (ref 11.8–14.4)
GFR, ESTIMATED: 37 ML/MIN/1.73M2
GLUCOSE SERPL-MCNC: 147 MG/DL (ref 82–115)
HCT VFR BLD AUTO: 27.1 % (ref 40.7–50.3)
HGB BLD-MCNC: 8.4 G/DL (ref 13–17)
MCH RBC QN AUTO: 31 PG (ref 25.2–33.5)
MCHC RBC AUTO-ENTMCNC: 31 G/DL (ref 28.4–34.8)
MCV RBC AUTO: 100 FL (ref 82.6–102.9)
NRBC BLD-RTO: 0 PER 100 WBC
PLATELET # BLD AUTO: 115 K/UL (ref 138–453)
PMV BLD AUTO: 11.2 FL (ref 8.1–13.5)
POTASSIUM SERPL-SCNC: 4.2 MMOL/L (ref 3.7–5.3)
RBC # BLD AUTO: 2.71 M/UL (ref 4.21–5.77)
SODIUM SERPL-SCNC: 141 MMOL/L (ref 136–145)
WBC OTHER # BLD: 3.3 K/UL (ref 3.5–11.3)

## 2024-12-27 PROCEDURE — P9603 ONE-WAY ALLOW PRORATED MILES: HCPCS

## 2024-12-27 PROCEDURE — 80048 BASIC METABOLIC PNL TOTAL CA: CPT

## 2024-12-27 PROCEDURE — 85027 COMPLETE CBC AUTOMATED: CPT

## 2024-12-27 PROCEDURE — 36415 COLL VENOUS BLD VENIPUNCTURE: CPT

## 2025-01-06 ENCOUNTER — HOSPITAL ENCOUNTER (OUTPATIENT)
Age: 85
Setting detail: SPECIMEN
Discharge: HOME | End: 2025-01-06

## 2025-01-06 LAB
ALBUMIN SERPL-MCNC: 3.4 G/DL (ref 3.5–5.2)
ALBUMIN/GLOB SERPL: 1.3 {RATIO} (ref 1–2.5)
ALP SERPL-CCNC: 64 U/L (ref 40–129)
ALT SERPL-CCNC: 12 U/L (ref 10–50)
ANION GAP SERPL CALCULATED.3IONS-SCNC: 12 MMOL/L (ref 9–16)
AST SERPL-CCNC: 31 U/L (ref 10–50)
BILIRUB SERPL-MCNC: 0.8 MG/DL (ref 0–1.2)
BUN SERPL-MCNC: 97 MG/DL (ref 8–23)
CALCIUM SERPL-MCNC: 9.5 MG/DL (ref 8.8–10.2)
CHLORIDE SERPL-SCNC: 90 MMOL/L (ref 98–107)
CO2 SERPL-SCNC: 36 MMOL/L (ref 20–31)
CREAT SERPL-MCNC: 1.8 MG/DL (ref 0.7–1.2)
GFR, ESTIMATED: 37 ML/MIN/1.73M2
GLUCOSE SERPL-MCNC: 154 MG/DL (ref 82–115)
POTASSIUM SERPL-SCNC: 3.9 MMOL/L (ref 3.7–5.3)
PROT SERPL-MCNC: 5.9 G/DL (ref 6.6–8.7)
SODIUM SERPL-SCNC: 137 MMOL/L (ref 136–145)

## 2025-01-06 PROCEDURE — 36415 COLL VENOUS BLD VENIPUNCTURE: CPT

## 2025-01-06 PROCEDURE — 80053 COMPREHEN METABOLIC PANEL: CPT

## 2025-01-06 PROCEDURE — P9603 ONE-WAY ALLOW PRORATED MILES: HCPCS
